# Patient Record
Sex: FEMALE | Race: WHITE
[De-identification: names, ages, dates, MRNs, and addresses within clinical notes are randomized per-mention and may not be internally consistent; named-entity substitution may affect disease eponyms.]

---

## 2019-11-24 ENCOUNTER — HOSPITAL ENCOUNTER (INPATIENT)
Dept: HOSPITAL 96 - M.ERS | Age: 54
LOS: 7 days | Discharge: ANOTHER HOSPITAL | DRG: 871 | End: 2019-12-01
Attending: INTERNAL MEDICINE | Admitting: INTERNAL MEDICINE
Payer: COMMERCIAL

## 2019-11-24 VITALS — DIASTOLIC BLOOD PRESSURE: 64 MMHG | SYSTOLIC BLOOD PRESSURE: 126 MMHG

## 2019-11-24 VITALS — DIASTOLIC BLOOD PRESSURE: 61 MMHG | SYSTOLIC BLOOD PRESSURE: 124 MMHG

## 2019-11-24 VITALS — SYSTOLIC BLOOD PRESSURE: 97 MMHG | DIASTOLIC BLOOD PRESSURE: 40 MMHG

## 2019-11-24 VITALS — DIASTOLIC BLOOD PRESSURE: 59 MMHG | SYSTOLIC BLOOD PRESSURE: 148 MMHG

## 2019-11-24 VITALS — SYSTOLIC BLOOD PRESSURE: 89 MMHG | DIASTOLIC BLOOD PRESSURE: 54 MMHG

## 2019-11-24 VITALS — DIASTOLIC BLOOD PRESSURE: 71 MMHG | SYSTOLIC BLOOD PRESSURE: 93 MMHG

## 2019-11-24 VITALS — DIASTOLIC BLOOD PRESSURE: 57 MMHG | SYSTOLIC BLOOD PRESSURE: 105 MMHG

## 2019-11-24 VITALS — WEIGHT: 293 LBS | BODY MASS INDEX: 44.41 KG/M2 | HEIGHT: 68 IN

## 2019-11-24 VITALS — DIASTOLIC BLOOD PRESSURE: 62 MMHG | SYSTOLIC BLOOD PRESSURE: 81 MMHG

## 2019-11-24 DIAGNOSIS — E78.5: ICD-10-CM

## 2019-11-24 DIAGNOSIS — Z23: ICD-10-CM

## 2019-11-24 DIAGNOSIS — E66.01: ICD-10-CM

## 2019-11-24 DIAGNOSIS — K82.9: ICD-10-CM

## 2019-11-24 DIAGNOSIS — Z96.642: ICD-10-CM

## 2019-11-24 DIAGNOSIS — G89.29: ICD-10-CM

## 2019-11-24 DIAGNOSIS — I48.21: ICD-10-CM

## 2019-11-24 DIAGNOSIS — F41.9: ICD-10-CM

## 2019-11-24 DIAGNOSIS — Z79.899: ICD-10-CM

## 2019-11-24 DIAGNOSIS — Z88.8: ICD-10-CM

## 2019-11-24 DIAGNOSIS — A41.51: Primary | ICD-10-CM

## 2019-11-24 DIAGNOSIS — F41.1: ICD-10-CM

## 2019-11-24 DIAGNOSIS — N17.0: ICD-10-CM

## 2019-11-24 DIAGNOSIS — Z87.891: ICD-10-CM

## 2019-11-24 DIAGNOSIS — I48.20: ICD-10-CM

## 2019-11-24 DIAGNOSIS — F32.9: ICD-10-CM

## 2019-11-24 DIAGNOSIS — Z82.49: ICD-10-CM

## 2019-11-24 DIAGNOSIS — M17.0: ICD-10-CM

## 2019-11-24 DIAGNOSIS — E11.9: ICD-10-CM

## 2019-11-24 DIAGNOSIS — K83.09: ICD-10-CM

## 2019-11-24 DIAGNOSIS — K80.01: ICD-10-CM

## 2019-11-24 DIAGNOSIS — K76.0: ICD-10-CM

## 2019-11-24 DIAGNOSIS — E87.6: ICD-10-CM

## 2019-11-24 LAB
ABSOLUTE MONOCYTES: 1.2 THOU/UL (ref 0–1.2)
ALBUMIN SERPL-MCNC: 3.6 G/DL (ref 3.4–5)
ALP SERPL-CCNC: 258 U/L (ref 46–116)
ALT SERPL-CCNC: 410 U/L (ref 30–65)
ANION GAP SERPL CALC-SCNC: 15 MMOL/L (ref 7–16)
APTT BLD: 27.3 SECONDS (ref 25–31.3)
AST SERPL-CCNC: 356 U/L (ref 15–37)
BILIRUB DIRECT SERPL-MCNC: 4.4 MG/DL
BILIRUB SERPL-MCNC: 5.3 MG/DL
BILIRUB SERPL-MCNC: 5.3 MG/DL
BILIRUB UR-MCNC: (no result) MG/DL
BUN SERPL-MCNC: 23 MG/DL (ref 7–18)
CALCIUM SERPL-MCNC: 9 MG/DL (ref 8.5–10.1)
CHLORIDE SERPL-SCNC: 97 MMOL/L (ref 98–107)
CO2 SERPL-SCNC: 22 MMOL/L (ref 21–32)
COLOR UR: (no result)
CREAT SERPL-MCNC: 1.2 MG/DL (ref 0.6–1.3)
GLUCOSE SERPL-MCNC: 124 MG/DL (ref 70–99)
GRANULOCYTES NFR BLD MANUAL: 87 %
HCT VFR BLD CALC: 41.1 % (ref 37–47)
HGB BLD-MCNC: 14 GM/DL (ref 12–15)
INR PPP: 1.1
KETONES UR STRIP-MCNC: NEGATIVE MG/DL
LIPASE: 43 U/L (ref 73–393)
LYMPHOCYTES # BLD: 1.9 THOU/UL (ref 0.8–5.3)
LYMPHOCYTES NFR BLD AUTO: 8 %
MCH RBC QN AUTO: 30.8 PG (ref 26–34)
MCHC RBC AUTO-ENTMCNC: 34 G/DL (ref 28–37)
MCV RBC: 90.4 FL (ref 80–100)
MONOCYTES NFR BLD: 5 %
MPV: 8.6 FL. (ref 7.2–11.1)
NEUTROPHILS # BLD: 20.3 THOU/UL (ref 1.6–8.1)
NUCLEATED RBCS: 0 /100WBC
PLATELET # BLD EST: ADEQUATE 10*3/UL
PLATELET COUNT*: 314 THOU/UL (ref 150–400)
POTASSIUM SERPL-SCNC: 3.1 MMOL/L (ref 3.5–5.1)
PROT SERPL-MCNC: 8.5 G/DL (ref 6.4–8.2)
PROT UR QL STRIP: (no result)
PROTHROMBIN TIME: 11.4 SECONDS (ref 9.2–11.5)
RBC # BLD AUTO: 4.54 MIL/UL (ref 4.2–5)
RBC # UR STRIP: NEGATIVE /UL
RBC MORPH BLD: NORMAL
RDW-CV: 13.2 % (ref 10.5–14.5)
SODIUM SERPL-SCNC: 134 MMOL/L (ref 136–145)
SP GR UR STRIP: <= 1.005 (ref 1–1.03)
URINE CLARITY: CLEAR
URINE GLUCOSE-RANDOM: NEGATIVE
URINE LEUKOCYTES-REFLEX: NEGATIVE
URINE NITRITE-REFLEX: NEGATIVE
UROBILINOGEN UR STRIP-ACNC: 2 E.U./DL (ref 0.2–1)
WBC # BLD AUTO: 23.3 THOU/UL (ref 4–11)

## 2019-11-24 NOTE — PROC
[facility] 
[address] [place]  [address]                    PROCEDURE REPORT              
_______________________________________________________________________________
 
Name:       NARINDER REHMAN           Room:           [address] [place]  
M.R.#:  [id number]      Account #:      [id number]  
Admission:  11/24/19     Attend Phys:    Amrik Kessler MD 
Discharge:  12/01/19     Date of Birth:  [birth year]  
         Report #: [id number]
                                                                                
_______________________________________________________________________________
THIS REPORT FOR:  //name//                      
 
For GI report, please see the Provation report in Perceptive 7 content.
 
 
 
 
 
 
 
 
 
 
 
 
 
 
 
 
 
 
 
 
 
 
 
 
 
 
 
 
 
 
 
 
 
 
 
 
 
 
 
 
 
                       
                                        By:                                
                 
D: 11/27/19     _______________________________________
T: 12/04/19 0642Medical Records Staff DAVIAN       /AL

## 2019-11-25 VITALS — DIASTOLIC BLOOD PRESSURE: 40 MMHG | SYSTOLIC BLOOD PRESSURE: 90 MMHG

## 2019-11-25 VITALS — SYSTOLIC BLOOD PRESSURE: 115 MMHG | DIASTOLIC BLOOD PRESSURE: 57 MMHG

## 2019-11-25 VITALS — SYSTOLIC BLOOD PRESSURE: 133 MMHG | DIASTOLIC BLOOD PRESSURE: 67 MMHG

## 2019-11-25 VITALS — DIASTOLIC BLOOD PRESSURE: 85 MMHG | SYSTOLIC BLOOD PRESSURE: 140 MMHG

## 2019-11-25 VITALS — SYSTOLIC BLOOD PRESSURE: 93 MMHG | DIASTOLIC BLOOD PRESSURE: 68 MMHG

## 2019-11-25 LAB
ABSOLUTE BASOPHILS: 0 THOU/UL (ref 0–0.2)
ABSOLUTE EOSINOPHILS: 0 THOU/UL (ref 0–0.7)
ABSOLUTE MONOCYTES: 0.5 THOU/UL (ref 0–1.2)
ALBUMIN SERPL-MCNC: 2.9 G/DL (ref 3.4–5)
ALP SERPL-CCNC: 202 U/L (ref 46–116)
ALT SERPL-CCNC: 298 U/L (ref 30–65)
ANION GAP SERPL CALC-SCNC: 14 MMOL/L (ref 7–16)
AST SERPL-CCNC: 207 U/L (ref 15–37)
BASOPHILS NFR BLD AUTO: 0.1 %
BILIRUB SERPL-MCNC: 4.8 MG/DL
BUN SERPL-MCNC: 26 MG/DL (ref 7–18)
CALCIUM SERPL-MCNC: 8.1 MG/DL (ref 8.5–10.1)
CHLORIDE SERPL-SCNC: 102 MMOL/L (ref 98–107)
CO2 SERPL-SCNC: 22 MMOL/L (ref 21–32)
CREAT SERPL-MCNC: 1.1 MG/DL (ref 0.6–1.3)
EOSINOPHIL NFR BLD: 0.1 %
GLUCOSE SERPL-MCNC: 46 MG/DL (ref 70–99)
GRANULOCYTES NFR BLD MANUAL: 93.1 %
HCT VFR BLD CALC: 36.2 % (ref 37–47)
HGB BLD-MCNC: 11.9 GM/DL (ref 12–15)
INR PPP: 1.2
LYMPHOCYTES # BLD: 0.5 THOU/UL (ref 0.8–5.3)
LYMPHOCYTES NFR BLD AUTO: 3.4 %
MAGNESIUM SERPL-MCNC: 1.7 MG/DL (ref 1.8–2.4)
MCH RBC QN AUTO: 30.8 PG (ref 26–34)
MCHC RBC AUTO-ENTMCNC: 32.9 G/DL (ref 28–37)
MCV RBC: 93.7 FL (ref 80–100)
MONOCYTES NFR BLD: 3.3 %
MPV: 8.5 FL. (ref 7.2–11.1)
NEUTROPHILS # BLD: 14.1 THOU/UL (ref 1.6–8.1)
NUCLEATED RBCS: 0 /100WBC
PLATELET COUNT*: 178 THOU/UL (ref 150–400)
POTASSIUM SERPL-SCNC: 3.7 MMOL/L (ref 3.5–5.1)
PROT SERPL-MCNC: 6.7 G/DL (ref 6.4–8.2)
PROTHROMBIN TIME: 12.7 SECONDS (ref 9.2–11.5)
RBC # BLD AUTO: 3.86 MIL/UL (ref 4.2–5)
RDW-CV: 13.7 % (ref 10.5–14.5)
SODIUM SERPL-SCNC: 138 MMOL/L (ref 136–145)
WBC # BLD AUTO: 15.1 THOU/UL (ref 4–11)

## 2019-11-25 NOTE — 2DMMODE
[place] [address]
Phone:  [phone] 2 D/M-MODE ECHOCARDIOGRAM     
_______________________________________________________________________________
 
Name:         NARINDER REHMAN          Room:          [address] [place] 
[facility]#:    [id number]     Account #:     [id number]  
Admission:    11/24/19    Attend Phys:   Amrik Kessler, 
Discharge:                Date of Birth: [birth year]  
Date of Service: 11/25/19 1607  Report #:      [id number]
        [id number]
_______________________________________________________________________________
THIS REPORT FOR:  //name//                      
 
 
--------------- ADDENDUM APPROVED REPORT --------------
 
 
Study performed:  11/25/2019 14:36:44
 
EXAM: Comprehensive 2D, Doppler, and color-flow 
Echocardiogram 
Patient Location: In-Patient   
Room #:  209     Status:  routine
 
       BSA:         2.52
HR: 101 bpm   BP:          90/40 mmHg 
Rhythm: Atrial Fibrillation    
 
Other Information 
Technically limited study due to  PATIENT COULD NOT TOLERATE APICAL 
VIEWS.
 
Indications
Atrial Fibrillation
 
2D Dimensions
IVSd:  11.54 (7-11mm) LVOT Diam:  23.68 (18-24mm) 
LVDd:  51.35 mm  
PWd:  11.48 (7-11mm) Ascending Ao:  40.63 (22-36mm)
LVDs:  44.45 (25-40mm) 
Aortic Root:  35.58 mm 
 
Pulmonary Valve
PV Peak Phil.:  1.00 m/s PV Peak Gr.:  4.02 mmHg
 
Tricuspid Valve
    RAP Estimate:  5.00 mmHg
TR Peak Gr.:  32.44 mmHg RVSP:  37.00 mmHg
    PA Pressure:  37.00 mmHg
 
Left Ventricle
The left ventricle is normal size. There is normal LV segmental wall 
motion. Mild concentric left ventricular hypertrophy. Left 
ventricular systolic function is normal. The left ventricular 
ejection fraction is within the normal range. LVEF is 60-65%. This 
study is not technically sufficient to allow evaluation of the LV 
diastolic function due to atrial fibrillation.
 
 
[place] [address]
Phone:  [phone]                     2 D/M-MODE ECHOCARDIOGRAM     
_______________________________________________________________________________
 
Name:         NARINDER REHMAN          Room:          [address]    ADM [place] 
M.R.#:    [id number]     Account #:     [id number]  
Admission:    11/24/19    Attend Phys:   Amrik Kessler, 
Discharge:                Date of Birth: [birth year]  
Date of Service: 11/25/19 1607  Report #:      [id number]
        [id number]
_______________________________________________________________________________
 
Right Ventricle
The right ventricle is normal size. The right ventricular systolic 
function is normal.
 
Atria
Left atrium is mildly dilated. The right atrium size is 
normal.
 
Aortic Valve
The aortic valve is normal in structure. No aortic regurgitation is 
present. There is no aortic valvular stenosis.
 
Mitral Valve
The mitral valve is normal in structure. Mild mitral regurgitation. 
No evidence of mitral valve stenosis.
 
Tricuspid Valve
The tricuspid valve is normal in structure. There mild tricuspid 
valve regurgitation noted. estimated pa pressure 40 mm Hg
 
Pulmonic Valve
The pulmonary valve is normal in structure. There is no pulmonic 
valvular regurgitation.
 
Great Vessels
Aortic root is mildly dilated.  IVC is normal in size and collapses 
>50% with inspiration.
 
Pericardium
There is no pericardial effusion.
 
<Conclusion>
Mild concentric left ventricular hypertrophy.
LVEF is 60-65%.
Left atrium is mildly dilated.
Mild mitral regurgitation.
There mild tricuspid valve regurgitation noted.
estimated pa pressure 40 mm Hg
 
 
 
 
 
  <ELECTRONICALLY SIGNED>
                                           By: Cb Kelly MD, FACC      
  11/25/19     1607
D: 11/25/19 1607   _____________________________________
T: 11/25/19 1607   Cb Kelly MD, FACC        /INF

## 2019-11-25 NOTE — NUR
RECEIVED REPORT AND ASSUMED CARE AT 1900. PT IN ER ROOM 17. ASSESSMENT
COMPLETED AS CHARTED. FAMILY BEDSIDE. PT HR SUSTAINING 150-170'S. PHYSICIA
NOTIFIED. ORDERS RECEIVED FOR CARDIZEM TITRATION. MEDICATION ADMIN PER EMAR.
PT REPORTED FEELING BETTER, -120'S. ON RA. NO COMPLAINTS OF PAIN.
PHYSICIAN NOTIFIED. PT ADMITTED TO TELE. REPORT CALLED. PT TRANSPORTED TO ROOM
209.

## 2019-11-25 NOTE — NUR
AM TEMP 102.0, TYLENOL WITH SIP OF H20 OTHER WISE REMAINS NPO. APPEARED TO BE
BLOOD TINGED STOOL SMEARED ON BED SHEET AND PT BUTTOCKS. UNABLE TO COLLECT A
SPECIMEN. AM HGB 11.9. GI CONSULTED FOR THIS AM.

## 2019-11-25 NOTE — CON
[address]  [address]                    CONSULTATION                  
_______________________________________________________________________________
 
Name:       NARINDER REHMAN           Room:           [address]    [place]M [place]  
M.R.#:  [id number]      Account #:      [id number]  
Admission:  19     Attend Phys:    Amrik Kessler MD 
Discharge:               Date of Birth:  [birth year]  
         Report #: [id number]
                                                                     [id number]  
_______________________________________________________________________________
THIS REPORT FOR:  //name//                      
 
CC: [place] physician/PCP
    Amrik COON DO
 
DATE OF SERVICE:  2019
 
 
HISTORY OF PRESENT ILLNESS:  The patient is a 54-year-old single white female,
who I was asked to see in the hospital today after she was noted to be in atrial
fibrillation.  The patient has an extensive past medical history.  She
previously weighed over 600 pounds and underwent gastric bypass surgery in
[place] years ago.  She is now down to 328 pounds.  She has a history of
AFib.  She actually saw Dr. Zhou in  and he performed cardioversion.  She
was placed on sotalol, but had recurrent AFib.  Dr. Zhou finally decided to
aim for rate control with sotalol rather than repeat cardioversion.  She has
never been anticoagulated.  She is not very active because of large size, and
primarily using a wheelchair.  She notes she was doing well until yesterday, she
had vomiting.  She felt chills and shortness of breath.  She came to the
Emergency Room by ambulance.  She was found to be in rapid atrial fibrillation. 
She was started on IV Cardizem.  I was asked to see her for further evaluation
and treatment.  She did note some chest discomfort that went into her back.  She
denied lightheadedness or syncope.
 
PAST MEDICAL HISTORY:  Otherwise, she has had , hernia repair.  No
history of hypertension, diabetes, hyperlipidemia.
 
MEDICATIONS:  On admission include sotalol 80 mg twice a day, which she has
taken for a couple of days.  She takes an aspirin a day, potassium pills,
trazodone, Paxil.
 
ALLERGIES:  SHE HAS A PREVIOUS INTOLERANCE TO DEMEROL AND CARDIZEM.
 
FAMILY HISTORY:  Her mother had an enlarged heart.
 
SOCIAL HISTORY:  She is  from her .  She lives in [place]
with her son.  She is not working at this time.  She quit smoking 6 months ago,
used to smoke half pack of cigarettes a day.  Rarely drinks alcohol.  She is on
disability due to arthritis.
 
REVIEW OF SYSTEMS:  No history of stroke, sleep apnea.  She has a history of
gallstones, no kidney disease.  No chronic skin condition.
 
PHYSICAL EXAMINATION:
GENERAL:  Revealed a large middle-aged female, lying in bed[place] [address]                    CONSULTATION                  
_______________________________________________________________________________
 
Name:       NARINDER REHMAN SUMAYA           Room:           [address] [place]  
[facility].#:  [id number]      Account #:      [id number]  
Admission:  19     Attend Phys:    Amrik Kessler MD 
Discharge:               Date of Birth:  [birth year]  
         Report #: [id number]
                                                                     [id number]  
_______________________________________________________________________________
distress.
VITAL SIGNS:  She has a blood pressure of 120/60, pulse is 90.  She is afebrile.
HEENT:  She is anicteric.  Conjunctivae are pink.  Mucous membranes are moist.
NECK:  Veins difficult to assess.
CHEST:  Clear to auscultation.
CARDIOVASCULAR:  Irregular rhythm.  No significant murmur.
ABDOMEN:  Obese.
EXTREMITIES:  Had no pitting edema.  Dorsalis pedis pulse 1+ bilaterally.
SKIN:  Cool and dry.
NEUROLOGIC:  Nonfocal.
 
RADIOLOGICAL DATA:  Her ECG on admission showed atrial fibrillation with rapid
ventricular response rate, nonspecific ST and T-wave change.  Her workup, she
had an echocardiogram in  that showed ejection fraction of 50%, moderate
tricuspid regurgitation.  Her x-rays, yesterday she had a chest x-ray that
showed no acute abnormality.
 
LABORATORY WORK:  Sodium 138, creatinine 1.1, SGOT 207, bilirubin 4.8, SGPT 298,
alkaline phosphatase 202.
 
Her white blood cell count was 15.1, hemoglobin 11.9.
 
IMPRESSION AND RECOMMENDATIONS:
1.  Permanent atrial fibrillation.  I would recommend switching from sotalol to
metoprolol for rate control.  Since her LIZ score is only 0, I would not
recommend anticoagulation at this time.
2.  Previous tobacco abuse.
3.  Morbid obesity.
4.  Gallstones.
5.  Elevated liver function studies.
 
 
 
 
 
 
 
 
 
 
 
 
 
 
<ELECTRONICALLY SIGNED>
                                        By:  Cb Kelly MD, FACC      
19     1404
D: 19 0924_______________________________________
T: 19 1022Davibaldev Kelly MD, FACC         /nt

## 2019-11-25 NOTE — EKG
[place] [address]
Phone:  [phone]                     ELECTROCARDIOGRAM REPORT      
_______________________________________________________________________________
 
Name:       NARINDER REHMAN           Room:           [address]    ADM [place]  
M.R.#:  [id number]      Account #:      [id number]  
Admission:  19     Attend Phys:    Amrik Kessler MD 
Discharge:               Date of Birth:  [birth year]  
         Report #: [id number]
    [id number]
_______________________________________________________________________________
THIS REPORT FOR:  //name//                      
 
                         [facility] ED
                                       
Test Date:    2019               Test Time:    15:49:21
Pat Name:     NARINDER REHMAN             Department:   
Patient ID:   [id number]            Room:         [facility]
Gender:       F                        Technician:   LATONYA
:          1965               Requested By: Stefan Mancini
Order Number: [id number] MD:   Cb Kelly
                                 Measurements
Intervals                              Axis          
Rate:         196                      P:            192
HI:           132                      QRS:          26
QRSD:         83                       T:            230
QT:           238                                    
QTc:          430                                    
                           Interpretive Statements
atrial fibrillation
Repolarization abnormality, prob rate related
Baseline wander in lead(s) V4,V5
Compared to ECG 2016 10:57:15
 
rate has increased
T-wave abnormality no longer present
 
Electronically Signed On 2019 13:09:15 CST by Cb Kelly
[email or web address]
 
 
 
 
 
 
 
 
 
 
 
 
 
 
 
  <ELECTRONICALLY SIGNED>
                                           By: Cb Kelly MD, FACC      
  19     1309
D: 19 1549   _____________________________________
T: 19 1549   Cb Kelly MD, [facility]        /EPI

## 2019-11-25 NOTE — NUR
PT OKAY FOR SIPS DIET PER SURGERY. TOLERATING SIPS. ONE LOOSE BM THIS SHIFT,
DARK RED COLORED. UP TO THE BSC, STB. A FIB ON THE MONITOR, RATE CONTROLLED,
CARDIZEM TITRATED TO 5 MG/HR. NS  MLS/HR. DILAUDID ADMINISTERED ONCE FOR
ABD AND BACK PAIN. PT GETS VERY EMOTIONAL AND TEARFUL AT TIMES, EMOTIONAL
SUPPORT PROVIDED. MAGNESIUM REPLACED. PT TO BE KEPT MID NIGHT NPO FOR IR
BILIARY CHOLANGIOGRAM TOMORROW.

## 2019-11-25 NOTE — NUR
Pt is A&O. Resides at home with her son. Independent. Pt has a walker and wc
that she can use if needed. Hx of HH. No hx of SNF. Goal is home at [place].
Following.

## 2019-11-25 NOTE — NUR
PT ADMIT  AT 2250. ALERT ORIENTED. ON CARDIZEM QTT AT 15MG/HR. HR AFIB
WITH RATE UP . VIVIAN IN PLACE. DR ZUNIGA NOTIFIED OF VIVIAN, ORDER
OBTAINED TO KEEP. NS AT 250MLS/HR. ORDER TO RUN AT 100MLS/HR AT 0600. HR NOW
AFIB . PT GIVEN AMBIEN TO SLEEP. PT MADE NPO AT [place] FOR CARDIOLOGY
CONSULT. BRODERICK.

## 2019-11-26 VITALS — DIASTOLIC BLOOD PRESSURE: 64 MMHG | SYSTOLIC BLOOD PRESSURE: 104 MMHG

## 2019-11-26 VITALS — DIASTOLIC BLOOD PRESSURE: 75 MMHG | SYSTOLIC BLOOD PRESSURE: 113 MMHG

## 2019-11-26 VITALS — SYSTOLIC BLOOD PRESSURE: 129 MMHG | DIASTOLIC BLOOD PRESSURE: 81 MMHG

## 2019-11-26 VITALS — DIASTOLIC BLOOD PRESSURE: 85 MMHG | SYSTOLIC BLOOD PRESSURE: 127 MMHG

## 2019-11-26 VITALS — DIASTOLIC BLOOD PRESSURE: 63 MMHG | SYSTOLIC BLOOD PRESSURE: 117 MMHG

## 2019-11-26 VITALS — DIASTOLIC BLOOD PRESSURE: 68 MMHG | SYSTOLIC BLOOD PRESSURE: 113 MMHG

## 2019-11-26 VITALS — DIASTOLIC BLOOD PRESSURE: 73 MMHG | SYSTOLIC BLOOD PRESSURE: 117 MMHG

## 2019-11-26 VITALS — DIASTOLIC BLOOD PRESSURE: 69 MMHG | SYSTOLIC BLOOD PRESSURE: 132 MMHG

## 2019-11-26 VITALS — SYSTOLIC BLOOD PRESSURE: 117 MMHG | DIASTOLIC BLOOD PRESSURE: 73 MMHG

## 2019-11-26 VITALS — SYSTOLIC BLOOD PRESSURE: 122 MMHG | DIASTOLIC BLOOD PRESSURE: 75 MMHG

## 2019-11-26 VITALS — SYSTOLIC BLOOD PRESSURE: 132 MMHG | DIASTOLIC BLOOD PRESSURE: 67 MMHG

## 2019-11-26 VITALS — DIASTOLIC BLOOD PRESSURE: 97 MMHG | SYSTOLIC BLOOD PRESSURE: 123 MMHG

## 2019-11-26 VITALS — SYSTOLIC BLOOD PRESSURE: 132 MMHG | DIASTOLIC BLOOD PRESSURE: 65 MMHG

## 2019-11-26 VITALS — SYSTOLIC BLOOD PRESSURE: 126 MMHG | DIASTOLIC BLOOD PRESSURE: 62 MMHG

## 2019-11-26 LAB
ABSOLUTE BASOPHILS: 0 THOU/UL (ref 0–0.2)
ABSOLUTE EOSINOPHILS: 0.1 THOU/UL (ref 0–0.7)
ABSOLUTE MONOCYTES: 0.4 THOU/UL (ref 0–1.2)
ALBUMIN SERPL-MCNC: 2.4 G/DL (ref 3.4–5)
ALP SERPL-CCNC: 160 U/L (ref 46–116)
ALT SERPL-CCNC: 173 U/L (ref 30–65)
ANION GAP SERPL CALC-SCNC: 9 MMOL/L (ref 7–16)
AST SERPL-CCNC: 103 U/L (ref 15–37)
BASOPHILS NFR BLD AUTO: 0.5 %
BILIRUB SERPL-MCNC: 3.4 MG/DL
BUN SERPL-MCNC: 16 MG/DL (ref 7–18)
CALCIUM SERPL-MCNC: 8.1 MG/DL (ref 8.5–10.1)
CHLORIDE SERPL-SCNC: 103 MMOL/L (ref 98–107)
CO2 SERPL-SCNC: 24 MMOL/L (ref 21–32)
CREAT SERPL-MCNC: 0.7 MG/DL (ref 0.6–1.3)
EOSINOPHIL NFR BLD: 1.8 %
GLUCOSE SERPL-MCNC: 81 MG/DL (ref 70–99)
GRANULOCYTES NFR BLD MANUAL: 77 %
HCT VFR BLD CALC: 31 % (ref 37–47)
HGB BLD-MCNC: 10.4 GM/DL (ref 12–15)
LYMPHOCYTES # BLD: 1 THOU/UL (ref 0.8–5.3)
LYMPHOCYTES NFR BLD AUTO: 15 %
MCH RBC QN AUTO: 30.9 PG (ref 26–34)
MCHC RBC AUTO-ENTMCNC: 33.4 G/DL (ref 28–37)
MCV RBC: 92.4 FL (ref 80–100)
MONOCYTES NFR BLD: 5.7 %
MPV: 9.3 FL. (ref 7.2–11.1)
NEUTROPHILS # BLD: 5 THOU/UL (ref 1.6–8.1)
NUCLEATED RBCS: 0 /100WBC
PLATELET COUNT*: 144 THOU/UL (ref 150–400)
POTASSIUM SERPL-SCNC: 3.2 MMOL/L (ref 3.5–5.1)
PROT SERPL-MCNC: 6.3 G/DL (ref 6.4–8.2)
RBC # BLD AUTO: 3.35 MIL/UL (ref 4.2–5)
RDW-CV: 14 % (ref 10.5–14.5)
SODIUM SERPL-SCNC: 136 MMOL/L (ref 136–145)
WBC # BLD AUTO: 6.5 THOU/UL (ref 4–11)

## 2019-11-26 NOTE — NUR
VSS, ASSUMED CARE IN THE AM, ASSESSMENT PERFORMED AND CHARTED, FALL
PRECAUTIONS IN PLACE AND CALL LIGHT IN REACH, PT IS A&O4 BUT IS HAS ANXIETY
AND
STATES PAIN IN ABD AND LEFT ARM, PT IS ON RA AND IS UP WITH ONE ASSIST, PT IS
TRACING AFIB ON THE MONITOR, PT GOAL IS TO COMPLETE CPT WITH IR, WILL FOLLOW
WITH PLAN OF CARE AND HOURLY ROUNDS.

## 2019-11-26 NOTE — NUR
Pt is aox4, reading a-fib on telemetry, respirations are even and unlabored.
Pt is not in acute distress at this time. Will continued to monitor.

## 2019-11-27 VITALS — DIASTOLIC BLOOD PRESSURE: 74 MMHG | SYSTOLIC BLOOD PRESSURE: 127 MMHG

## 2019-11-27 VITALS — DIASTOLIC BLOOD PRESSURE: 86 MMHG | SYSTOLIC BLOOD PRESSURE: 125 MMHG

## 2019-11-27 VITALS — SYSTOLIC BLOOD PRESSURE: 139 MMHG | DIASTOLIC BLOOD PRESSURE: 67 MMHG

## 2019-11-27 VITALS — SYSTOLIC BLOOD PRESSURE: 132 MMHG | DIASTOLIC BLOOD PRESSURE: 64 MMHG

## 2019-11-27 VITALS — SYSTOLIC BLOOD PRESSURE: 149 MMHG | DIASTOLIC BLOOD PRESSURE: 94 MMHG

## 2019-11-27 LAB
ABSOLUTE BASOPHILS: 0 THOU/UL (ref 0–0.2)
ABSOLUTE EOSINOPHILS: 0.1 THOU/UL (ref 0–0.7)
ABSOLUTE MONOCYTES: 0.3 THOU/UL (ref 0–1.2)
ALBUMIN SERPL-MCNC: 2.3 G/DL (ref 3.4–5)
ALP SERPL-CCNC: 160 U/L (ref 46–116)
ALT SERPL-CCNC: 138 U/L (ref 30–65)
ANION GAP SERPL CALC-SCNC: 9 MMOL/L (ref 7–16)
AST SERPL-CCNC: 73 U/L (ref 15–37)
BASOPHILS NFR BLD AUTO: 0.4 %
BILIRUB SERPL-MCNC: 2.8 MG/DL
BUN SERPL-MCNC: 9 MG/DL (ref 7–18)
CALCIUM SERPL-MCNC: 8.4 MG/DL (ref 8.5–10.1)
CANCER AG19-9 SERPL-ACNC: 107 U/ML (ref 0–35)
CHLORIDE SERPL-SCNC: 102 MMOL/L (ref 98–107)
CO2 SERPL-SCNC: 25 MMOL/L (ref 21–32)
CREAT SERPL-MCNC: 0.7 MG/DL (ref 0.6–1.3)
EOSINOPHIL NFR BLD: 2.2 %
GLUCOSE SERPL-MCNC: 79 MG/DL (ref 70–99)
GRANULOCYTES NFR BLD MANUAL: 75.1 %
HCT VFR BLD CALC: 29.1 % (ref 37–47)
HGB BLD-MCNC: 9.9 GM/DL (ref 12–15)
LYMPHOCYTES # BLD: 0.7 THOU/UL (ref 0.8–5.3)
LYMPHOCYTES NFR BLD AUTO: 15.2 %
MCH RBC QN AUTO: 31.2 PG (ref 26–34)
MCHC RBC AUTO-ENTMCNC: 33.9 G/DL (ref 28–37)
MCV RBC: 92 FL (ref 80–100)
MONOCYTES NFR BLD: 7.1 %
MPV: 8.7 FL. (ref 7.2–11.1)
NEUTROPHILS # BLD: 3.4 THOU/UL (ref 1.6–8.1)
NUCLEATED RBCS: 0 /100WBC
PLATELET COUNT*: 153 THOU/UL (ref 150–400)
POTASSIUM SERPL-SCNC: 3.9 MMOL/L (ref 3.5–5.1)
PROT SERPL-MCNC: 6.5 G/DL (ref 6.4–8.2)
RBC # BLD AUTO: 3.16 MIL/UL (ref 4.2–5)
RDW-CV: 14 % (ref 10.5–14.5)
SODIUM SERPL-SCNC: 136 MMOL/L (ref 136–145)
WBC # BLD AUTO: 4.6 THOU/UL (ref 4–11)

## 2019-11-27 PROCEDURE — 0F9930Z DRAINAGE OF COMMON BILE DUCT WITH DRAINAGE DEVICE, PERCUTANEOUS APPROACH: ICD-10-PCS

## 2019-11-27 PROCEDURE — BF101ZZ FLUOROSCOPY OF BILE DUCTS USING LOW OSMOLAR CONTRAST: ICD-10-PCS | Performed by: RADIOLOGY

## 2019-11-27 PROCEDURE — 0DJ08ZZ INSPECTION OF UPPER INTESTINAL TRACT, VIA NATURAL OR ARTIFICIAL OPENING ENDOSCOPIC: ICD-10-PCS | Performed by: INTERNAL MEDICINE

## 2019-11-27 NOTE — NUR
Nutrition: Pt admitted with epigastric pain. Seen for high BMI. Pt not in room
at time of visit. Admit wt: 328#, possibly trending up, currently 356#. Unable
to reweigh pt at this time - please reweigh for accuracy. +BM. NPO. Biliary
obstruction, alfredo drain. Per progress notes, pt has surgical changes from
gastric bypass. Alb 2.3, prealb 11. On MVI.
Severely depleted protein stores. Recommend protein supplement once diet
advances. Mild to moderate risk. Will follow up per protocol, 12/2/19.

## 2019-11-27 NOTE — CON
[address]  [address]                    CONSULTATION                  
_______________________________________________________________________________
 
Name:       NARINDER REHMAN           Room:           [address] [place]  
M.R.#:  [id number]      Account #:      [id number]  
Admission:  11/24/19     Attend Phys:    Amrik Kessler MD 
Discharge:               Date of Birth:  [birth year]  
         Report #: [id number]
                                                                     [id number]  
_______________________________________________________________________________
THIS REPORT FOR:  //name//                      
 
CC: Amrik Wilkes
 
DATE OF SERVICE:  11/26/2019
 
 
INFECTIOUS DISEASE CONSULTATION
 
ATTENDING PHYSICIAN:  Amrik Kessler M.D.
 
REASON FOR EVALUATION:  Gram-negative septicemia, biliary tract source.
 
Chart reviewed.  Patient examined.
 
HISTORY OF PRESENT ILLNESS:  This is a 54-year-old woman with history of morbid
obesity who underwent a gastric bypass procedure, yet undefined at this point,
presented with epigastric type pain and did experience some nausea with dry
heaves and subsequently developed temperature elevations, recorded temperature
up to 102.  On further evaluation, she underwent imaging, which showed
obstructive gallstone with evidence of cholecystitis and cholangitis with
hyperbilirubinemia.  As part of the admission, blood cultures were collected,
now 2/2 positive with gram-negative rods, empirically started on piperacillin
and tazobactam.  She notes that she has had significant weight loss as a result
of her surgery.  She does take precautions while eating.  Denies any significant
pulmonary-related complaints at this point.  She is not toxic.
 
ALLERGIES:  Include MEPERIDINE, FENTANYL, AND TRAMADOL.
 
CURRENT MEDICATIONS:  Include metoprolol, multivitamin, Zosyn, diltiazem,
hydromorphone, ondansetron, p.r.n. analgesics, and antiemetics.
 
PAST MEDICAL HISTORY:  As described above, morbid obesity.  At one point,
weighed greater than 600 pounds.  She has gastric bypass surgery, previous
C-sections, umbilical hernia repair, degenerative joint disease of bilateral
knees, anxiety, and depression.
 
SOCIAL HISTORY:  Former smoker, occasional ethanol, no illicit drug use.
 
FAMILY HISTORY:  Noncontributory.
 
REVIEW OF SYSTEMS:  Otherwise, unremarkable 10-point review of systems except
noted the above in the history of present illness.
 
PHYSICAL EXAMINATION:
 
 
 
[place] [address]                    CONSULTATION                  
_______________________________________________________________________________
 
Name:       NARINDER REHMAN SUMAYA           Room:           [address] [place]  
[facility]#:  [id number]      Account #:      [id number]  
Admission:  11/24/19     Attend Phys:    Amrik Kessler MD 
Discharge:               Date of Birth:  [birth year]  
         Report #: [id number]
                                                                     [id number]  
_______________________________________________________________________________
GENERAL:  She is sitting up in chair.  She is alert, cooperative.  She is
pleasant, some in mild-to-moderate distress.  She appears somewhat chronically
ill, perhaps mildly undernourished.
VITAL SIGNS:  Temperature 98.3, pulse 106, respirations 14, blood pressure
104/64.
SKIN:  Warm, dry.
HEENT:  Normocephalic.  Extraocular muscles are intact.
NECK:  Supple.
LUNGS:  Diminished breath sounds overall.  I do not appreciate any evidence of
consolidation.
HEART:  Borderline tachycardic, regular.  I do not appreciate any murmur.
ABDOMEN:  Obese, distended.  There is some tenderness in the right epigastric
region, not so much in the right upper quadrant.
EXTREMITIES:  Lower extremities have a degree of edema.
GENITOURINARY AND RECTAL:  Deferred.
 
LABORATORY DATA:  Blood cultures as described above from the 24th revealed
gram-negative rods.  TSH was 1.485.  Electrolytes most recently, sodium 136,
potassium 3.2, chloride 103, bicarbonate 24, anion gap of 9, BUN and creatinine
16 and 0.7, glucose of 81.  AST of 103 that is down initially from 356, ALT of
298 that is down from 410.  Most recent direct bilirubin of 3.6, actually, total
bilirubin was 5.3 on admission and direct was 4.4.  Initial lactic acid was 3.7,
repeat was 2.7.  Troponin is less than 0.06.  Abdominal ultrasound showed
cholelithiasis, consistent with very large calculus, marked diffuse gallbladder
mural thickening suggesting cholecystitis, hepatomegaly.  Echo showed EF of
60-65%, mild mitral regurgitation.  MRI and MRCP confirmed the cholelithiasis,
acute cholecystitis, mild intraductal dilatation.
 
ASSESSMENT AND PLAN:
1.  Obstructing gallstone complicated by cholangitis, acute cholecystitis. 
Agree with empiric therapy for broad-spectrum gram-negative coverage.
2.  Gram-negative rods isolated on blood culture.  Again, I think this most
likely source.  Seemingly, she is not overtly toxic, needs a resolution of her
obstruction primarily to correct.  Certainly at risk for nosocomial related
infectious complications.  There is a spirometer made available.  She was
encouraged to use that.  We will continue to monitor expectantly.  Discussed
with she and her son.
 
 
 
 
 
 
 
<ELECTRONICALLY SIGNED>
                                        By:  Cristiano Chin MD           
11/27/19     1124
D: 11/26/19 1120_______________________________________
T: 11/26/19 1203Jokyler Chin MD              /nt

## 2019-11-27 NOTE — NUR
PT SPILLED BILARY DRAIN DRAINAGE ONTO THE FLOOR. SOMEHOW PT WAS ABLE TO OPEN
THE DRAIN AND SPILLED LL THE CONTENTS OUT ONTO THE FLOOR. I AM UNABLE TO EVEN
ESTIMATE THE OUTPUT.

## 2019-11-27 NOTE — NUR
Pt is aox4, running a-fib on telemetry, respirations are even and unlabored.
Pt is in no acute distress at this time. Will continue to monitor.

## 2019-11-28 VITALS — SYSTOLIC BLOOD PRESSURE: 142 MMHG | DIASTOLIC BLOOD PRESSURE: 88 MMHG

## 2019-11-28 VITALS — SYSTOLIC BLOOD PRESSURE: 128 MMHG | DIASTOLIC BLOOD PRESSURE: 71 MMHG

## 2019-11-28 VITALS — SYSTOLIC BLOOD PRESSURE: 124 MMHG | DIASTOLIC BLOOD PRESSURE: 76 MMHG

## 2019-11-28 VITALS — DIASTOLIC BLOOD PRESSURE: 64 MMHG | SYSTOLIC BLOOD PRESSURE: 91 MMHG

## 2019-11-28 VITALS — DIASTOLIC BLOOD PRESSURE: 76 MMHG | SYSTOLIC BLOOD PRESSURE: 129 MMHG

## 2019-11-28 VITALS — SYSTOLIC BLOOD PRESSURE: 129 MMHG | DIASTOLIC BLOOD PRESSURE: 66 MMHG

## 2019-11-28 LAB
ALBUMIN SERPL-MCNC: 2.4 G/DL (ref 3.4–5)
ALP SERPL-CCNC: 145 U/L (ref 46–116)
ALT SERPL-CCNC: 104 U/L (ref 30–65)
ANION GAP SERPL CALC-SCNC: 10 MMOL/L (ref 7–16)
AST SERPL-CCNC: 44 U/L (ref 15–37)
BILIRUB SERPL-MCNC: 1.8 MG/DL
BUN SERPL-MCNC: 6 MG/DL (ref 7–18)
CALCIUM SERPL-MCNC: 8.7 MG/DL (ref 8.5–10.1)
CHLORIDE SERPL-SCNC: 102 MMOL/L (ref 98–107)
CO2 SERPL-SCNC: 27 MMOL/L (ref 21–32)
CREAT SERPL-MCNC: 0.7 MG/DL (ref 0.6–1.3)
GLUCOSE SERPL-MCNC: 122 MG/DL (ref 70–99)
HCT VFR BLD CALC: 28.5 % (ref 37–47)
HGB BLD-MCNC: 9.7 GM/DL (ref 12–15)
MCH RBC QN AUTO: 31.3 PG (ref 26–34)
MCHC RBC AUTO-ENTMCNC: 33.9 G/DL (ref 28–37)
MCV RBC: 92.2 FL (ref 80–100)
MPV: 8.7 FL. (ref 7.2–11.1)
PLATELET COUNT*: 169 THOU/UL (ref 150–400)
POTASSIUM SERPL-SCNC: 3.3 MMOL/L (ref 3.5–5.1)
PROT SERPL-MCNC: 6.8 G/DL (ref 6.4–8.2)
RBC # BLD AUTO: 3.1 MIL/UL (ref 4.2–5)
RDW-CV: 14 % (ref 10.5–14.5)
SODIUM SERPL-SCNC: 139 MMOL/L (ref 136–145)
WBC # BLD AUTO: 4.1 THOU/UL (ref 4–11)

## 2019-11-28 NOTE — NUR
ASSUMED CARE OF PATIENT THIS AM AT 0730.  PATIENT IS ALERT AND ORIENTED X 4.
SHE C/O ADB PAIN THIS AM AND REPORTED IV MORPHINE WAS NO RELIEVING HER PAIN.
PATIENT MEDICATED WITH DILAUDID PER ORDER.  SHE STATED PAIN WAS RELIEVED.  SHE
IS BELCHING ALOT AND C/O INDIGESTION.  DR PATEL NOTIFIED.  TELE SHOWS
AFIB.  BILARY DRAIN DRAINING BROWN DRAINAGE.  WILL CONTINUE TO MONITOR COMFORT
AND SAFETY.

## 2019-11-29 VITALS — DIASTOLIC BLOOD PRESSURE: 60 MMHG | SYSTOLIC BLOOD PRESSURE: 106 MMHG

## 2019-11-29 VITALS — DIASTOLIC BLOOD PRESSURE: 91 MMHG | SYSTOLIC BLOOD PRESSURE: 141 MMHG

## 2019-11-29 VITALS — SYSTOLIC BLOOD PRESSURE: 123 MMHG | DIASTOLIC BLOOD PRESSURE: 83 MMHG

## 2019-11-29 VITALS — SYSTOLIC BLOOD PRESSURE: 133 MMHG | DIASTOLIC BLOOD PRESSURE: 67 MMHG

## 2019-11-29 VITALS — DIASTOLIC BLOOD PRESSURE: 88 MMHG | SYSTOLIC BLOOD PRESSURE: 149 MMHG

## 2019-11-29 VITALS — DIASTOLIC BLOOD PRESSURE: 52 MMHG | SYSTOLIC BLOOD PRESSURE: 80 MMHG

## 2019-11-29 VITALS — DIASTOLIC BLOOD PRESSURE: 68 MMHG | SYSTOLIC BLOOD PRESSURE: 119 MMHG

## 2019-11-29 VITALS — DIASTOLIC BLOOD PRESSURE: 70 MMHG | SYSTOLIC BLOOD PRESSURE: 97 MMHG

## 2019-11-29 LAB
ALBUMIN SERPL-MCNC: 2.7 G/DL (ref 3.4–5)
ALP SERPL-CCNC: 138 U/L (ref 46–116)
ALT SERPL-CCNC: 85 U/L (ref 30–65)
ANION GAP SERPL CALC-SCNC: 11 MMOL/L (ref 7–16)
AST SERPL-CCNC: 29 U/L (ref 15–37)
BILIRUB SERPL-MCNC: 1.5 MG/DL
BUN SERPL-MCNC: 12 MG/DL (ref 7–18)
CALCIUM SERPL-MCNC: 8.9 MG/DL (ref 8.5–10.1)
CHLORIDE SERPL-SCNC: 101 MMOL/L (ref 98–107)
CO2 SERPL-SCNC: 26 MMOL/L (ref 21–32)
CREAT SERPL-MCNC: 0.7 MG/DL (ref 0.6–1.3)
GLUCOSE SERPL-MCNC: 105 MG/DL (ref 70–99)
HCT VFR BLD CALC: 30.9 % (ref 37–47)
HGB BLD-MCNC: 10.4 GM/DL (ref 12–15)
MCH RBC QN AUTO: 30.9 PG (ref 26–34)
MCHC RBC AUTO-ENTMCNC: 33.7 G/DL (ref 28–37)
MCV RBC: 91.7 FL (ref 80–100)
MPV: 8.5 FL. (ref 7.2–11.1)
PLATELET COUNT*: 249 THOU/UL (ref 150–400)
POTASSIUM SERPL-SCNC: 3.9 MMOL/L (ref 3.5–5.1)
PROT SERPL-MCNC: 7.4 G/DL (ref 6.4–8.2)
RBC # BLD AUTO: 3.37 MIL/UL (ref 4.2–5)
RDW-CV: 13.7 % (ref 10.5–14.5)
SODIUM SERPL-SCNC: 138 MMOL/L (ref 136–145)
WBC # BLD AUTO: 5.7 THOU/UL (ref 4–11)

## 2019-11-29 NOTE — NUR
ASSUMED CARE OF PT AFTER REPORT AT 1930. PT A&OX4. VSS. PHYSICAL ASSESSMENT
COMLPETED AND CHARTED. PT ON RA. PT TRACING AFIB ON TELE. -170 WHEN
GETTING UP. PT WITH PARK TO DEPENDENT DRAIN. PT WITH BILI DRAIN PATENT. PT
COMPLAINED OF ONE EPISODE OF NAUSEA & RIGHT UPPER ABDOMINAL PAIN-MEDS GIVEN
PER MAR. CALL LIGHT WITHIN REACH.

## 2019-11-29 NOTE — NUR
PATIENT DECISION TO LOOK AT [facility] FOR HOME CARE POST
DISCHARGE.  I CALLED AND SPOKE WITH DOMONIQUE, TRANSITION NAVIGATOR FOR [employer]ENC[employer].
PHONE: [phone].
SHE WILL ATTEMPT TO MEET WITH PT BEFORE DISCHARGE TO HOME IF POSSIBLE.
PATIENT REPORTS SON & DAUGHTER ARE BOTH OFF ON SUNDAYS; SON IS OFF EVERY
WEEKEND AND DAUGHTER'S SCHEDULE VARIABLE DURING THE WEEK.

## 2019-11-29 NOTE — NUR
PT RELUCTANT TO MAKE DECISION ON HOME CARE PROVIDER COMING INTO HER HOME. PT
UNABLE TO GET TO DOOR OF HOME TO LET THEM IN AND HER SON/DTR WORK LONG HOURS
SO ARE OFTEN NOT THERE TO LET SOMEONE IN. PATIENT CONCERNED ABOUT GIVING
SOMEONE A KEY WITHOUT HAVING MET THE  SERVICE PROVIDER.

## 2019-11-30 VITALS — DIASTOLIC BLOOD PRESSURE: 57 MMHG | SYSTOLIC BLOOD PRESSURE: 126 MMHG

## 2019-11-30 VITALS — SYSTOLIC BLOOD PRESSURE: 126 MMHG | DIASTOLIC BLOOD PRESSURE: 57 MMHG

## 2019-11-30 VITALS — DIASTOLIC BLOOD PRESSURE: 91 MMHG | SYSTOLIC BLOOD PRESSURE: 143 MMHG

## 2019-11-30 VITALS — DIASTOLIC BLOOD PRESSURE: 69 MMHG | SYSTOLIC BLOOD PRESSURE: 105 MMHG

## 2019-11-30 VITALS — DIASTOLIC BLOOD PRESSURE: 57 MMHG | SYSTOLIC BLOOD PRESSURE: 104 MMHG

## 2019-11-30 LAB
ANION GAP SERPL CALC-SCNC: 9 MMOL/L (ref 7–16)
BUN SERPL-MCNC: 14 MG/DL (ref 7–18)
CALCIUM SERPL-MCNC: 8.6 MG/DL (ref 8.5–10.1)
CHLORIDE SERPL-SCNC: 100 MMOL/L (ref 98–107)
CO2 SERPL-SCNC: 27 MMOL/L (ref 21–32)
CREAT SERPL-MCNC: 0.7 MG/DL (ref 0.6–1.3)
GLUCOSE SERPL-MCNC: 83 MG/DL (ref 70–99)
HCT VFR BLD CALC: 29.9 % (ref 37–47)
HGB BLD-MCNC: 10 GM/DL (ref 12–15)
MCH RBC QN AUTO: 31 PG (ref 26–34)
MCHC RBC AUTO-ENTMCNC: 33.4 G/DL (ref 28–37)
MCV RBC: 93 FL (ref 80–100)
MPV: 8.1 FL. (ref 7.2–11.1)
PLATELET COUNT*: 289 THOU/UL (ref 150–400)
POTASSIUM SERPL-SCNC: 3.7 MMOL/L (ref 3.5–5.1)
RBC # BLD AUTO: 3.22 MIL/UL (ref 4.2–5)
RDW-CV: 14.1 % (ref 10.5–14.5)
SODIUM SERPL-SCNC: 136 MMOL/L (ref 136–145)
WBC # BLD AUTO: 8.2 THOU/UL (ref 4–11)

## 2019-11-30 NOTE — NUR
ASSUMED PT CARE REPORT RECEIVED FROM NURSE. PT IS AOX3 COMPLAINS OF PAIN IN
ABD AREA. PAIN SHOT GIVEN. PT ON RA. T TUBE AND PARK IN PLACE. TRANSFER TO
[facility] INITIATED. AWAITING FOR CONFIRMATION FROM [facility]. T TUBE
DRESSING REDONE BY NURSE. NO COMPLAINT. CALL LIGHT AT REACH. WILL CONTINUE TO
MONITOR

## 2019-11-30 NOTE — NUR
P T ALERT AND ORIENTED. EASILY TEARFUL. VSS ON RA. MEDS GIVEN PER EMAR. PT
RECEIVED PAIN MEDS MULTIPLE TIMES THIS SHIFT. PT SLEPT OFF AND ON THIS SHIFT.
LUI IV INFILTRATED AND DC'D. NEW IV TO LH 20G. NS @ 175. PT TRANSFERS VIA
WHEELCHAIR. BILI DRAIN EMPTIED THIS SHIFT. VIVIAN PATENT AND EMPTIED THIS
SHIFT. POSSIBLE TRANSFER TO [facility] PER DR GROSS. FALL PRECAUTIONS
IN PLACE. CALL LIGHT WITHIN REACH. HOURLY ROUNDINGS MADE. WILL CONTINUE TO
MONITOR.

## 2019-12-01 VITALS — DIASTOLIC BLOOD PRESSURE: 51 MMHG | SYSTOLIC BLOOD PRESSURE: 120 MMHG

## 2019-12-01 VITALS — SYSTOLIC BLOOD PRESSURE: 163 MMHG | DIASTOLIC BLOOD PRESSURE: 55 MMHG

## 2019-12-01 VITALS — SYSTOLIC BLOOD PRESSURE: 143 MMHG | DIASTOLIC BLOOD PRESSURE: 72 MMHG

## 2019-12-01 VITALS — SYSTOLIC BLOOD PRESSURE: 139 MMHG | DIASTOLIC BLOOD PRESSURE: 64 MMHG

## 2019-12-01 NOTE — NUR
[facility] ACCEPTED PATIENT. TRANSPORTATION SET UP. THIS NURSE GIVES REPORT TO
NURSE MEDINA FROM [facility]. PT TO GO TO ROOM 319. PARK OUT. BELONGINGS
PACKED. WILL AWAIT FOR .

## 2019-12-01 NOTE — NUR
ASSUMED CARE OF PT AFTER REPORT AT 1930. PT A&OX4. VSS. PHYSICAL ASSESSMENT
COMPLETED AND CHARTED. PT ON RA. PT TRACING AFIB ON TELE. PT UPSTANDBY TO
RESTROOM. PT WITH BILI DRAIN PATENT AND DRAINING WELL. PT WITH PARK TO
DEPENDENT DRAIN. PT COMPLAINED ABDOMINAL PAIN- MEDS GIVEN PER MAR. STILL
AWAITING INSURANCE APPROVAL. PT ABLE TO SLEEP WELL ON BED. CALL LIGHT WITHIN
REACH.

## 2019-12-01 NOTE — NUR
ASSUMED PT CARE REPORT RECEIVED FROM NURSE. PT IS AOX4 , TRACING AFIB ON
CARDIAC MONITOR.HEART RATE CONTROLLED IN THE 80S 90S. ON RA. O2 SAT 95%. VSS.
PAIN MEDICINE GIVEN. PT HAD A LARGE BOWEL MOVEMENT DARK BROWN COLOR. IV FLUID
INFUSING  PER HOUR. BILIDRAIN EMPTIED. CALL LIGHT AT [facility]. [facility] CONTACTED REGARDING PT TRANSFER . WILL AWAIT FOR RESPONSE. PT AWARE
OF NEW PLAN. WILL CONITNUE TO MONITOR

## 2019-12-06 NOTE — CON
[address]  [address]                    CONSULTATION                  
_______________________________________________________________________________
 
Name:       NARINDER REHMAN           Room:           [address] [place]  
M.R.#:  [id number]      Account #:      [id number]  
Admission:  19     Attend Phys:    Amrik Kessler MD 
Discharge:  19     Date of Birth:  [birth year]  
         Report #: [id number]
                                                                     [id number]  
_______________________________________________________________________________
THIS REPORT FOR:  //name//                      
 
CC: Amrik Wilkes DO
 
DATE OF SERVICE:  2019
 
 
REFERRING PHYSICIAN:  Amrik Kessler MD
 
REASON FOR CONSULTATION:  Abdominal pain and jaundice.
 
IMPRESSION:
1.  Obstructive jaundice, likely due to common bile duct stones versus Mirizzi's
syndrome and appears to be extrinsic compression of the common bile duct due to
acute cholecystitis (versus less likely malignancy or any other issues).  CT,
MRCP, and abdominal ultrasound reviewed with radiologist.
2.  Status post gastric bypass  for morbid obesity at [facility] - type unknown.
3.  Chronic atrial fibrillation, on sotalol.
 
RECOMMENDATIONS:
1.  Given the fact the patient has altered anatomy and the fact that it would be
very difficult for us to do an ERCP, we recommended that she undergo a PTC with
possible internal placement of biliary stent by Radiology tomorrow.  I have
discussed the patient's case with Dr. Amrik Black as well and he is agreeable to
the same.
2.  Depending on what type of gastric bypass she has had, we will determine
whether or not we will be able to proceed with an ERCP.  She has had a Elise-en-Y
bypass, it may not be even feasible to reach the bypassed afferent limb.  If she
has had a Billroth II surgery, which is something that they did in the remote
past, it would be feasible for us to proceed with an ERCP.  The patient is going
to find out which surgery she had and if we do not find out what she surgery she
has had, we will proceed with upper endoscopy to determine the same.
3.  We will continue with the patient on IV antibiotics.
4.  Continue with clear liquid diet for now.
5.  Further recommendation will be made after the PTC has been completed.  I
have discussed the plans with the patient as well.  She is agreeable to the
same.
 
HISTORY OF PRESENT ILLNESS:  The patient is a pleasant 54-year-old white female
who was admitted to hospital on the  with complaints of rather severe upper
abdominal pain radiating into her back and between her shoulder blades,
associated with nausea, vomiting as well as chills.  She has not had any fevers.
 She underwent a gastric bypass in  at [facility] as part of the study, at which
 
 
 
[place] [address]                    CONSULTATION                  
_______________________________________________________________________________
 
Name:       NARINDER REHMAN           Room:           [address] [place]  
M.R.#:  [id number]      Account #:      [id number]  
Admission:  19     Attend Phys:    Amrik Kessler MD 
Discharge:  19     Date of Birth:  [birth year]  
         Report #: [id number]
                                                                     [id number]  
_______________________________________________________________________________
time she weighed over 600 pounds.  She is currently over 300 pounds.  She denies
any complaints referable to her upper or lower GI tract, otherwise.  She has a
significant history of atrial fibrillation, is on sotalol.  She was seen through
the Emergency Room and found to have obstructive jaundice with dilated biliary
tree.  She had been in the hospital for further evaluation and treatment.
 
ALLERGIES:  TRAMADOL, MEPERIDINE AND FENTANYL.
 
MEDICATIONS AT HOME:  Include aspirin, potassium, iron, Paxil, ibuprofen,
multivitamin, and sotalol.
 
PAST MEDICAL HISTORY:  Remarkable for previous gastric bypass with chronic
anemia secondary to the same, has had anxiety, depression, problems with chronic
arthritis.  She has chronic atrial fibrillation as well.  She had previous
umbilical hernia repair, , generalized anxiety disorder, history of
known gallstones.  She had previous sciatic problems, pain with DJD involving
both knees.  She has a left hip replacement.  She has also had a history of
fatty liver as well.
 
SOCIAL HISTORY:  She was a former smoker, quit less than a year ago, drinks
occasional alcohol.
 
FAMILY HISTORY:  Negative.
 
PHYSICAL EXAMINATION:
GENERAL:  Revealed pleasant 54-year-old white female who is awake and alert.
CARDIOPULMONARY:  Revealed a regular rate and rhythm.
LUNGS:  Clear.
ABDOMEN:  Soft and minimally tender in the right upper quadrant.  No rebound or
guarding noted.
 
LABORATORY DATA:  Revealed a white count of 15.1, hemoglobin 11.9, platelet
count 178,000, MCV is 93.7, RDW 13.7.  Her differential is normal.  Her sodium
is 138, potassium 3.7, chloride 102, bicarbonate 22, BUN 26, creatinine 1.14,
GFR of 52.  Total bilirubin 4.8, alkaline phosphatase 202, AST is 207, . 
Albumin is 2.9.  By comparison in 2016, her liver function tests were all
completely normal.
 
CT scan of the abdomen and pelvis performed on  revealed possibly a large
mass noted within the gallbladder, which may be close to the adjacent colon. 
There is some wall thickening and some inflammatory change.  She also has ductal
dilation with intrahepatic ductal dilation, extrahepatic duct is not as
distended.  Spleen is normal.  Pancreas is normal.  Kidneys are normal.  She has
had postsurgical changes.
 
MRCP was performed on  was reviewed with radiologist.  There appeared to be
 
 
 
[address] [place]  [address]                    CONSULTATION                  
_______________________________________________________________________________
 
Name:       NARINDER REHMAN           Room:           [address] [place]  
M.R.#:  [id number]      Account #:      [id number]  
Admission:  19     Attend Phys:    Amrik Kessler MD 
Discharge:  19     Date of Birth:  [birth year]  
         Report #: [id number]
                                                                     [id number]  
_______________________________________________________________________________
a large gallbladder calculus evident that was previously noted on CT scan. 
There was also mural thickening and pericholecystic fluid in the right upper
quadrant.  The liver is enlarged.  There is nothing to suggest.  There is some
mild intrahepatic ductal dilation, possibly related to mass effect from the
enlarged gallbladder on the common hepatic duct.  MRCP was reviewed with Dr. Norton and appears to be incomplete filling of the distal common bile duct and
there appeared to be possibly some debris within the common bile duct.  It was
felt that there might be multiple small stones and debris within the distal
duct.
 
DISCUSSION:  At the present time, the patient had obstructive jaundice and
needed to have a PTC with biliary decompression which we were not able to do an
emergent ERCP with her previous gastric bypass surgery.  We will figure out what
she had for surgery.  With regards to her gastric bypass and to determine
whether or not it is feasible for us to do an ERCP while she is in the hospital.
 I have discussed the plans with the patient as well and is agreeable to the
same.
 
 
 
 
 
 
 
 
 
 
 
 
 
 
 
 
 
 
 
 
 
 
 
 
 
 
 
<ELECTRONICALLY SIGNED>
                                        By:  Gabino Pinto DO          
19     1635
D: 19 0320_______________________________________
T: 19 0615DO storm Allen

## 2019-12-18 ENCOUNTER — HOSPITAL ENCOUNTER (EMERGENCY)
Dept: HOSPITAL 96 - M.ERS | Age: 54
Discharge: ANOTHER HOSPITAL | End: 2019-12-18
Payer: COMMERCIAL

## 2019-12-18 VITALS — BODY MASS INDEX: 44.41 KG/M2 | HEIGHT: 68 IN | WEIGHT: 293 LBS

## 2019-12-18 VITALS — DIASTOLIC BLOOD PRESSURE: 74 MMHG | SYSTOLIC BLOOD PRESSURE: 104 MMHG

## 2019-12-18 DIAGNOSIS — N17.9: Primary | ICD-10-CM

## 2019-12-18 DIAGNOSIS — Z88.8: ICD-10-CM

## 2019-12-18 DIAGNOSIS — E87.1: ICD-10-CM

## 2019-12-18 DIAGNOSIS — F17.210: ICD-10-CM

## 2019-12-18 DIAGNOSIS — E66.01: ICD-10-CM

## 2019-12-18 DIAGNOSIS — Z88.6: ICD-10-CM

## 2019-12-18 DIAGNOSIS — Z98.890: ICD-10-CM

## 2019-12-18 DIAGNOSIS — M17.0: ICD-10-CM

## 2019-12-18 DIAGNOSIS — K94.23: ICD-10-CM

## 2019-12-18 DIAGNOSIS — I48.20: ICD-10-CM

## 2019-12-18 LAB
ABSOLUTE BASOPHILS: 0 THOU/UL (ref 0–0.2)
ABSOLUTE EOSINOPHILS: 0.1 THOU/UL (ref 0–0.7)
ABSOLUTE MONOCYTES: 0.7 THOU/UL (ref 0–1.2)
ALBUMIN SERPL-MCNC: 3.6 G/DL (ref 3.4–5)
ALP SERPL-CCNC: 172 U/L (ref 46–116)
ALT SERPL-CCNC: 57 U/L (ref 30–65)
ANION GAP SERPL CALC-SCNC: 13 MMOL/L (ref 7–16)
AST SERPL-CCNC: 31 U/L (ref 15–37)
BASOPHILS NFR BLD AUTO: 0.4 %
BILIRUB SERPL-MCNC: 0.7 MG/DL
BUN SERPL-MCNC: 81 MG/DL (ref 7–18)
CALCIUM SERPL-MCNC: 9.5 MG/DL (ref 8.5–10.1)
CHLORIDE SERPL-SCNC: 91 MMOL/L (ref 98–107)
CO2 SERPL-SCNC: 15 MMOL/L (ref 21–32)
CREAT SERPL-MCNC: 2.2 MG/DL (ref 0.6–1.3)
EOSINOPHIL NFR BLD: 1.1 %
GLUCOSE SERPL-MCNC: 124 MG/DL (ref 70–99)
GRANULOCYTES NFR BLD MANUAL: 83.7 %
HCT VFR BLD CALC: 44.8 % (ref 37–47)
HGB BLD-MCNC: 15.3 GM/DL (ref 12–15)
LIPASE: 542 U/L (ref 73–393)
LYMPHOCYTES # BLD: 0.9 THOU/UL (ref 0.8–5.3)
LYMPHOCYTES NFR BLD AUTO: 8.1 %
MCH RBC QN AUTO: 30.9 PG (ref 26–34)
MCHC RBC AUTO-ENTMCNC: 34 G/DL (ref 28–37)
MCV RBC: 90.9 FL (ref 80–100)
MONOCYTES NFR BLD: 6.7 %
MPV: 9.9 FL. (ref 7.2–11.1)
NEUTROPHILS # BLD: 9.2 THOU/UL (ref 1.6–8.1)
NUCLEATED RBCS: 0 /100WBC
PLATELET COUNT*: 318 THOU/UL (ref 150–400)
POTASSIUM SERPL-SCNC: 5.6 MMOL/L (ref 3.5–5.1)
PROT SERPL-MCNC: 9.5 G/DL (ref 6.4–8.2)
RBC # BLD AUTO: 4.93 MIL/UL (ref 4.2–5)
RDW-CV: 13.8 % (ref 10.5–14.5)
SODIUM SERPL-SCNC: 119 MMOL/L (ref 136–145)
WBC # BLD AUTO: 11 THOU/UL (ref 4–11)

## 2019-12-18 NOTE — EKG
[place] [address]
Phone:  [phone]                     ELECTROCARDIOGRAM REPORT      
_______________________________________________________________________________
 
Name:       NARINDER REHMAN SUMAYA           Room:                      [facility] ER  
M.RAna Paula#:  [id number]      Account #:      [id number]  
Admission:  19     Attend Phys:                         
Discharge:               Date of Birth:  [birth year]  
         Report #: [id number]
    [id number]
_______________________________________________________________________________
THIS REPORT FOR:  //name//                      
 
                         [facility] ED
                                       
Test Date:    2019               Test Time:    12:04:38
Pat Name:     NARINDER REHMAN             Department:   
Patient ID:   [id number]            Room:          
Gender:       F                        Technician:   
:          1965               Requested By: Greg Fernandes
Order Number: [id number] MD:   Reese Sales
                                 Measurements
Intervals                              Axis          
Rate:         135                      P:            
WI:                                    QRS:          30
QRSD:         97                       T:            6
QT:           285                                    
QTc:          428                                    
                           Interpretive Statements
Atrial fibrillation
Borderline repolarization abnormality
Compared to ECG 2019 15:49:21
No significant changes
 
Electronically Signed On 2019 14:16:51 CST by Reese Sales
[email or web address]
 
 
 
 
 
 
 
 
 
 
 
 
 
 
 
 
 
 
  <ELECTRONICALLY SIGNED>
                                           By: Reese Sales MD, [facility]     
  19     1416
D: 19 1204   _____________________________________
T: 19 1204   Reese Sales MD, FACC       /EPI

## 2020-01-12 ENCOUNTER — HOSPITAL ENCOUNTER (EMERGENCY)
Dept: HOSPITAL 96 - M.ERS | Age: 55
Discharge: HOME | End: 2020-01-12
Payer: MEDICARE

## 2020-01-12 VITALS — BODY MASS INDEX: 44.41 KG/M2 | HEIGHT: 68 IN | WEIGHT: 293 LBS

## 2020-01-12 VITALS — SYSTOLIC BLOOD PRESSURE: 127 MMHG | DIASTOLIC BLOOD PRESSURE: 88 MMHG

## 2020-01-12 DIAGNOSIS — M13.861: ICD-10-CM

## 2020-01-12 DIAGNOSIS — Z98.890: ICD-10-CM

## 2020-01-12 DIAGNOSIS — F17.210: ICD-10-CM

## 2020-01-12 DIAGNOSIS — Z88.6: ICD-10-CM

## 2020-01-12 DIAGNOSIS — E66.01: ICD-10-CM

## 2020-01-12 DIAGNOSIS — R10.11: Primary | ICD-10-CM

## 2020-01-12 DIAGNOSIS — M13.862: ICD-10-CM

## 2020-01-12 DIAGNOSIS — G89.29: ICD-10-CM

## 2020-01-12 DIAGNOSIS — Z88.8: ICD-10-CM

## 2020-01-12 LAB
ABSOLUTE BASOPHILS: 0.1 THOU/UL (ref 0–0.2)
ABSOLUTE EOSINOPHILS: 0.4 THOU/UL (ref 0–0.7)
ABSOLUTE MONOCYTES: 0.5 THOU/UL (ref 0–1.2)
ALBUMIN SERPL-MCNC: 2.7 G/DL (ref 3.4–5)
ALP SERPL-CCNC: 116 U/L (ref 46–116)
ALT SERPL-CCNC: 40 U/L (ref 30–65)
ANION GAP SERPL CALC-SCNC: 6 MMOL/L (ref 7–16)
APTT BLD: 25.5 SECONDS (ref 25–31.3)
AST SERPL-CCNC: 45 U/L (ref 15–37)
BASOPHILS NFR BLD AUTO: 1.6 %
BE: -1.5 MMOL/L
BILIRUB SERPL-MCNC: 0.2 MG/DL
BILIRUB UR-MCNC: NEGATIVE MG/DL
BUN SERPL-MCNC: 17 MG/DL (ref 7–18)
CALCIUM SERPL-MCNC: 8.5 MG/DL (ref 8.5–10.1)
CHLORIDE SERPL-SCNC: 105 MMOL/L (ref 98–107)
CO2 SERPL-SCNC: 31 MMOL/L (ref 21–32)
COLOR UR: YELLOW
CREAT SERPL-MCNC: 0.8 MG/DL (ref 0.6–1.3)
EOSINOPHIL NFR BLD: 4.9 %
GLUCOSE SERPL-MCNC: 90 MG/DL (ref 70–99)
GRANULOCYTES NFR BLD MANUAL: 64.4 %
HCT VFR BLD CALC: 38.1 % (ref 37–47)
HGB BLD-MCNC: 12.6 GM/DL (ref 12–15)
INR PPP: 1
KETONES UR STRIP-MCNC: NEGATIVE MG/DL
LYMPHOCYTES # BLD: 1.8 THOU/UL (ref 0.8–5.3)
LYMPHOCYTES NFR BLD AUTO: 23.3 %
MAGNESIUM SERPL-MCNC: 1.8 MG/DL (ref 1.8–2.4)
MCH RBC QN AUTO: 30.4 PG (ref 26–34)
MCHC RBC AUTO-ENTMCNC: 33.2 G/DL (ref 28–37)
MCV RBC: 91.8 FL (ref 80–100)
MONOCYTES NFR BLD: 5.8 %
MPV: 8 FL. (ref 7.2–11.1)
MUCUS: (no result) STRN/LPF
NEUTROPHILS # BLD: 5 THOU/UL (ref 1.6–8.1)
NUCLEATED RBCS: 0 /100WBC
PCO2 BLD: 35.4 MMHG (ref 35–45)
PLATELET COUNT*: 338 THOU/UL (ref 150–400)
PO2 BLD: 94.2 MMHG (ref 75–100)
POTASSIUM SERPL-SCNC: 4 MMOL/L (ref 3.5–5.1)
PROT SERPL-MCNC: 7.4 G/DL (ref 6.4–8.2)
PROT UR QL STRIP: NEGATIVE
PROTHROMBIN TIME: 10 SECONDS (ref 9.2–11.5)
RBC # BLD AUTO: 4.15 MIL/UL (ref 4.2–5)
RBC # UR STRIP: (no result) /UL
RBC #/AREA URNS HPF: (no result) /HPF (ref 0–2)
RDW-CV: 15.5 % (ref 10.5–14.5)
SODIUM SERPL-SCNC: 142 MMOL/L (ref 136–145)
SP GR UR STRIP: 1.02 (ref 1–1.03)
SQUAMOUS: (no result) /LPF (ref 0–3)
URINE CLARITY: CLEAR
URINE GLUCOSE-RANDOM: NEGATIVE
URINE LEUKOCYTES-REFLEX: (no result)
URINE NITRITE-REFLEX: NEGATIVE
URINE WBC-REFLEX: (no result) /HPF (ref 0–5)
UROBILINOGEN UR STRIP-ACNC: 0.2 E.U./DL (ref 0.2–1)
WBC # BLD AUTO: 7.9 THOU/UL (ref 4–11)

## 2020-01-13 NOTE — EKG
[place] [address]
Phone:  [phone]                     ELECTROCARDIOGRAM REPORT      
_______________________________________________________________________________
 
Name:       NARINDER REHMAN SUMAYA           Room:                      [facility]#:  [id number]      Account #:      [id number]  
Admission:  20     Attend Phys:                         
Discharge:  20     Date of Birth:  [birth year]  
         Report #: [id number]
    [id number]
_______________________________________________________________________________
THIS REPORT FOR:  //name//                      
 
                         [facility] ED
                                       
Test Date:    2020               Test Time:    10:18:59
Pat Name:     NARINDER REHMAN             Department:   
Patient ID:   [id number]            Room:          
Gender:       F                        Technician:   LEYDA
:          1965               Requested By: Lori Beckford
Order Number: [id number] MD:   Jordin Zhou
                                 Measurements
Intervals                              Axis          
Rate:         100                      P:            
MN:                                    QRS:          20
QRSD:         92                       T:            221
QT:           299                                    
QTc:          386                                    
                           Interpretive Statements
Atrial fibrillation
Low voltage, precordial leads
Nonspecific repol abnormality, diffuse leads
Compared to ECG 2019 12:04:38
Low QRS voltage now present
 
Electronically Signed On 2020 17:05:41 CST by Jordin Zhou
[email or web address]
 
 
 
 
 
 
 
 
 
 
 
 
 
 
 
 
 
  <ELECTRONICALLY SIGNED>
                                           By: Jordin Zhou MD, [facility]   
  20     1705
D: 20 1018   _____________________________________
T: 20 1018   Jordin Zhou MD, FACC     /EPI

## 2020-02-22 ENCOUNTER — HOSPITAL ENCOUNTER (EMERGENCY)
Dept: HOSPITAL 96 - M.ERS | Age: 55
Discharge: HOME | End: 2020-02-22
Payer: MEDICARE

## 2020-02-22 VITALS — BODY MASS INDEX: 44.41 KG/M2 | HEIGHT: 68 IN | WEIGHT: 293 LBS

## 2020-02-22 VITALS — DIASTOLIC BLOOD PRESSURE: 75 MMHG | SYSTOLIC BLOOD PRESSURE: 115 MMHG

## 2020-02-22 DIAGNOSIS — I48.91: ICD-10-CM

## 2020-02-22 DIAGNOSIS — G89.29: ICD-10-CM

## 2020-02-22 DIAGNOSIS — Z98.84: ICD-10-CM

## 2020-02-22 DIAGNOSIS — Z88.8: ICD-10-CM

## 2020-02-22 DIAGNOSIS — F17.210: ICD-10-CM

## 2020-02-22 DIAGNOSIS — Z88.6: ICD-10-CM

## 2020-02-22 DIAGNOSIS — Z98.890: ICD-10-CM

## 2020-02-22 DIAGNOSIS — M19.90: ICD-10-CM

## 2020-02-22 DIAGNOSIS — G89.18: ICD-10-CM

## 2020-02-22 DIAGNOSIS — K80.20: Primary | ICD-10-CM

## 2020-02-22 LAB
ABSOLUTE BASOPHILS: 0.1 THOU/UL (ref 0–0.2)
ABSOLUTE EOSINOPHILS: 0.2 THOU/UL (ref 0–0.7)
ABSOLUTE MONOCYTES: 0.8 THOU/UL (ref 0–1.2)
ALBUMIN SERPL-MCNC: 3.2 G/DL (ref 3.4–5)
ALP SERPL-CCNC: 92 U/L (ref 46–116)
ALT SERPL-CCNC: 34 U/L (ref 30–65)
ANION GAP SERPL CALC-SCNC: 11 MMOL/L (ref 7–16)
APTT BLD: 28.8 SECONDS (ref 25–31.3)
AST SERPL-CCNC: 32 U/L (ref 15–37)
BASOPHILS NFR BLD AUTO: 1 %
BILIRUB SERPL-MCNC: 0.5 MG/DL
BUN SERPL-MCNC: 11 MG/DL (ref 7–18)
CALCIUM SERPL-MCNC: 9.2 MG/DL (ref 8.5–10.1)
CHLORIDE SERPL-SCNC: 102 MMOL/L (ref 98–107)
CO2 SERPL-SCNC: 25 MMOL/L (ref 21–32)
CREAT SERPL-MCNC: 0.9 MG/DL (ref 0.6–1.3)
EOSINOPHIL NFR BLD: 2.3 %
GLUCOSE SERPL-MCNC: 109 MG/DL (ref 70–99)
GRANULOCYTES NFR BLD MANUAL: 76.7 %
HCT VFR BLD CALC: 42.7 % (ref 37–47)
HGB BLD-MCNC: 14.1 GM/DL (ref 12–15)
INR PPP: 1
LIPASE: 36 U/L (ref 73–393)
LYMPHOCYTES # BLD: 1.2 THOU/UL (ref 0.8–5.3)
LYMPHOCYTES NFR BLD AUTO: 12 %
MCH RBC QN AUTO: 29.8 PG (ref 26–34)
MCHC RBC AUTO-ENTMCNC: 33.1 G/DL (ref 28–37)
MCV RBC: 89.8 FL (ref 80–100)
MONOCYTES NFR BLD: 8 %
MPV: 8.5 FL. (ref 7.2–11.1)
NEUTROPHILS # BLD: 8 THOU/UL (ref 1.6–8.1)
NUCLEATED RBCS: 0 /100WBC
PLATELET COUNT*: 278 THOU/UL (ref 150–400)
POTASSIUM SERPL-SCNC: 3.9 MMOL/L (ref 3.5–5.1)
PROT SERPL-MCNC: 8.4 G/DL (ref 6.4–8.2)
PROTHROMBIN TIME: 10.7 SECONDS (ref 9.2–11.5)
RBC # BLD AUTO: 4.75 MIL/UL (ref 4.2–5)
RDW-CV: 14.6 % (ref 10.5–14.5)
SODIUM SERPL-SCNC: 138 MMOL/L (ref 136–145)
WBC # BLD AUTO: 10.4 THOU/UL (ref 4–11)

## 2020-02-23 NOTE — EKG
[place] [address]
Phone:  [phone]                     ELECTROCARDIOGRAM REPORT      
_______________________________________________________________________________
 
Name:         NARINDER REHMAN SUMAYA          Room:                     [facility]#:    [id number]     Account #:     [id number]  
Admission:    20    Attend Phys:                     
Discharge:    20    Date of Birth: [birth year]  
Date of Service: 20  Report #:      [id number]
        [id number]
_______________________________________________________________________________
THIS REPORT FOR:  //name//                      
 
                         [facility] ED
                                       
Test Date:    2020               Test Time:    19:15:13
Pat Name:     NARINDER REHMAN             Department:   
Patient ID:   [id number]            Room:          
Gender:                               Technician:   FL
:          1965               Requested By: Chele Santana
Order Number: [id number] MD:   Cb Kelly
                                 Measurements
Intervals                              Axis          
Rate:         147                      P:            
MO:                                    QRS:          20
QRSD:         84                       T:            205
QT:           241                                    
QTc:          377                                    
                           Interpretive Statements
Atrial fibrillation
Repol abnrm suggests ischemia, anterolateral
Compared to ECG 2020 10:18:59
Possible ischemia now present
 
Electronically Signed On 2020 12:20:07 CST by Cb Kelly
[email or web address]
 
 
 
 
 
 
 
 
 
 
 
 
 
 
 
 
 
 
 
  <ELECTRONICALLY SIGNED>
                                           By: Cb Kelly MD, [facility]      
  20     1220
D: 20   _____________________________________
T: 20   Cb Kelly MD, [facility]        /EPI

## 2020-03-02 ENCOUNTER — HOSPITAL ENCOUNTER (EMERGENCY)
Dept: HOSPITAL 96 - M.ERS | Age: 55
Discharge: HOME | End: 2020-03-02
Payer: MEDICARE

## 2020-03-02 VITALS — WEIGHT: 293 LBS | BODY MASS INDEX: 44.41 KG/M2 | HEIGHT: 68 IN

## 2020-03-02 VITALS — DIASTOLIC BLOOD PRESSURE: 84 MMHG | SYSTOLIC BLOOD PRESSURE: 116 MMHG

## 2020-03-02 DIAGNOSIS — Z88.6: ICD-10-CM

## 2020-03-02 DIAGNOSIS — I48.91: ICD-10-CM

## 2020-03-02 DIAGNOSIS — Z96.649: ICD-10-CM

## 2020-03-02 DIAGNOSIS — Z98.890: ICD-10-CM

## 2020-03-02 DIAGNOSIS — F17.210: ICD-10-CM

## 2020-03-02 DIAGNOSIS — R10.11: Primary | ICD-10-CM

## 2020-03-02 LAB
ABSOLUTE BASOPHILS: 0 THOU/UL (ref 0–0.2)
ABSOLUTE EOSINOPHILS: 0.4 THOU/UL (ref 0–0.7)
ABSOLUTE MONOCYTES: 0.6 THOU/UL (ref 0–1.2)
ALBUMIN SERPL-MCNC: 3 G/DL (ref 3.4–5)
ALP SERPL-CCNC: 108 U/L (ref 46–116)
ALT SERPL-CCNC: 31 U/L (ref 30–65)
ANION GAP SERPL CALC-SCNC: 12 MMOL/L (ref 7–16)
APTT BLD: 29.1 SECONDS (ref 25–31.3)
AST SERPL-CCNC: 27 U/L (ref 15–37)
BASOPHILS NFR BLD AUTO: 0.6 %
BILIRUB SERPL-MCNC: 0.2 MG/DL
BUN SERPL-MCNC: 16 MG/DL (ref 7–18)
CALCIUM SERPL-MCNC: 8.9 MG/DL (ref 8.5–10.1)
CHLORIDE SERPL-SCNC: 102 MMOL/L (ref 98–107)
CO2 SERPL-SCNC: 24 MMOL/L (ref 21–32)
CREAT SERPL-MCNC: 0.8 MG/DL (ref 0.6–1.3)
EOSINOPHIL NFR BLD: 4.7 %
GLUCOSE SERPL-MCNC: 104 MG/DL (ref 70–99)
GRANULOCYTES NFR BLD MANUAL: 66.3 %
HCT VFR BLD CALC: 41.9 % (ref 37–47)
HGB BLD-MCNC: 13.8 GM/DL (ref 12–15)
INR PPP: 1
LYMPHOCYTES # BLD: 1.8 THOU/UL (ref 0.8–5.3)
LYMPHOCYTES NFR BLD AUTO: 21.1 %
MCH RBC QN AUTO: 29.4 PG (ref 26–34)
MCHC RBC AUTO-ENTMCNC: 33 G/DL (ref 28–37)
MCV RBC: 89.1 FL (ref 80–100)
MONOCYTES NFR BLD: 7.3 %
MPV: 7.9 FL. (ref 7.2–11.1)
NEUTROPHILS # BLD: 5.6 THOU/UL (ref 1.6–8.1)
NT-PRO BRAIN NAT PEPTIDE: 374 PG/ML (ref ?–300)
NUCLEATED RBCS: 0 /100WBC
PLATELET COUNT*: 384 THOU/UL (ref 150–400)
POTASSIUM SERPL-SCNC: 4 MMOL/L (ref 3.5–5.1)
PROT SERPL-MCNC: 8.2 G/DL (ref 6.4–8.2)
PROTHROMBIN TIME: 10.2 SECONDS (ref 9.2–11.5)
RBC # BLD AUTO: 4.7 MIL/UL (ref 4.2–5)
RDW-CV: 14.7 % (ref 10.5–14.5)
SODIUM SERPL-SCNC: 138 MMOL/L (ref 136–145)
WBC # BLD AUTO: 8.4 THOU/UL (ref 4–11)

## 2020-03-02 NOTE — EKG
[place] [address]
Phone:  [phone]                     ELECTROCARDIOGRAM REPORT      
_______________________________________________________________________________
 
Name:         NARINDER REHMAN SUMAYA          Room:                     [facility]#:    [id number]     Account #:     [id number]  
Admission:    20    Attend Phys:                     
Discharge:    20    Date of Birth: [birth year]  
Date of Service: 20 1335  Report #:      [id number]
        [id number]
_______________________________________________________________________________
THIS REPORT FOR:  //name//                      
 
                         [facility] ED
                                       
Test Date:    2020               Test Time:    13:35:18
Pat Name:     NARINDER REHMAN             Department:   
Patient ID:   [id number]            Room:          
Gender:       F                        Technician:   
:          1965               Requested By: Stefan Mancini
Order Number: [id number] MD:   Cb Kelly
                                 Measurements
Intervals                              Axis          
Rate:         108                      P:            
MN:                                    QRS:          32
QRSD:         90                       T:            210
QT:           284                                    
QTc:          381                                    
                           Interpretive Statements
Atrial fibrillation
Low voltage, precordial leads
Borderline repolarization abnormality
Compared to ECG 2020 19:15:13
Low QRS voltage now present
Possible ischemia no longer present
rate has slowed
 
Electronically Signed On 3-2-2020 15:53:47 CST by Cb Kelly
[email or web address]
 
 
 
 
 
 
 
 
 
 
 
 
 
 
 
 
  <ELECTRONICALLY SIGNED>
                                           By: Cb Kelly MD, FAC      
  20     1553
D: 20 1335   _____________________________________
T: 20 1335   Cb Kelly MD, [facility]        /EPI

## 2020-03-24 ENCOUNTER — HOSPITAL ENCOUNTER (EMERGENCY)
Dept: HOSPITAL 96 - M.ERS | Age: 55
Discharge: HOME | End: 2020-03-24
Payer: MEDICARE

## 2020-03-24 VITALS — DIASTOLIC BLOOD PRESSURE: 72 MMHG | SYSTOLIC BLOOD PRESSURE: 111 MMHG

## 2020-03-24 VITALS — WEIGHT: 293 LBS | HEIGHT: 68 IN | BODY MASS INDEX: 44.41 KG/M2

## 2020-03-24 DIAGNOSIS — Z88.8: ICD-10-CM

## 2020-03-24 DIAGNOSIS — I48.91: ICD-10-CM

## 2020-03-24 DIAGNOSIS — Z88.6: ICD-10-CM

## 2020-03-24 DIAGNOSIS — Z98.890: ICD-10-CM

## 2020-03-24 DIAGNOSIS — F17.210: ICD-10-CM

## 2020-03-24 DIAGNOSIS — F41.0: Primary | ICD-10-CM

## 2020-03-24 DIAGNOSIS — Z96.649: ICD-10-CM

## 2020-03-24 LAB
ABSOLUTE BASOPHILS: 0.1 THOU/UL (ref 0–0.2)
ABSOLUTE EOSINOPHILS: 0.3 THOU/UL (ref 0–0.7)
ABSOLUTE MONOCYTES: 0.5 THOU/UL (ref 0–1.2)
ALBUMIN SERPL-MCNC: 3.2 G/DL (ref 3.4–5)
ALP SERPL-CCNC: 79 U/L (ref 46–116)
ALT SERPL-CCNC: 38 U/L (ref 30–65)
ANION GAP SERPL CALC-SCNC: 11 MMOL/L (ref 7–16)
APTT BLD: 26 SECONDS (ref 25–31.3)
AST SERPL-CCNC: 39 U/L (ref 15–37)
BASOPHILS NFR BLD AUTO: 1.2 %
BILIRUB SERPL-MCNC: 0.2 MG/DL
BUN SERPL-MCNC: 21 MG/DL (ref 7–18)
CALCIUM SERPL-MCNC: 8.9 MG/DL (ref 8.5–10.1)
CHLORIDE SERPL-SCNC: 106 MMOL/L (ref 98–107)
CK-MB MASS: 0.9 NG/ML
CO2 SERPL-SCNC: 24 MMOL/L (ref 21–32)
CREAT SERPL-MCNC: 0.8 MG/DL (ref 0.6–1.3)
EOSINOPHIL NFR BLD: 3.7 %
GLUCOSE SERPL-MCNC: 111 MG/DL (ref 70–99)
GRANULOCYTES NFR BLD MANUAL: 64.6 %
HCT VFR BLD CALC: 38.9 % (ref 37–47)
HGB BLD-MCNC: 13.1 GM/DL (ref 12–15)
INR PPP: 1
LIPASE: 77 U/L (ref 73–393)
LYMPHOCYTES # BLD: 1.8 THOU/UL (ref 0.8–5.3)
LYMPHOCYTES NFR BLD AUTO: 23.9 %
MAGNESIUM SERPL-MCNC: 2 MG/DL (ref 1.8–2.4)
MCH RBC QN AUTO: 29.9 PG (ref 26–34)
MCHC RBC AUTO-ENTMCNC: 33.7 G/DL (ref 28–37)
MCV RBC: 88.8 FL (ref 80–100)
MONOCYTES NFR BLD: 6.6 %
MPV: 8.7 FL. (ref 7.2–11.1)
NEUTROPHILS # BLD: 4.9 THOU/UL (ref 1.6–8.1)
NT-PRO BRAIN NAT PEPTIDE: 638 PG/ML (ref ?–300)
NUCLEATED RBCS: 0 /100WBC
PLATELET COUNT*: 233 THOU/UL (ref 150–400)
POTASSIUM SERPL-SCNC: 3.9 MMOL/L (ref 3.5–5.1)
PROT SERPL-MCNC: 7.6 G/DL (ref 6.4–8.2)
PROTHROMBIN TIME: 10 SECONDS (ref 9.2–11.5)
RBC # BLD AUTO: 4.38 MIL/UL (ref 4.2–5)
RDW-CV: 16.2 % (ref 10.5–14.5)
SODIUM SERPL-SCNC: 141 MMOL/L (ref 136–145)
WBC # BLD AUTO: 7.5 THOU/UL (ref 4–11)

## 2020-03-25 NOTE — EKG
[place] [address]
Phone:  [phone]                     ELECTROCARDIOGRAM REPORT      
_______________________________________________________________________________
 
Name:         NARINDER REHMAN SUMAYA          Room:                     [facility]#:    [id number]     Account #:     [id number]  
Admission:    20    Attend Phys:                     
Discharge:    20    Date of Birth: [birth year]  
Date of Service: 20 1611  Report #:      [id number]
        [id number]
_______________________________________________________________________________
THIS REPORT FOR:  //name//                      
 
                         [facility] ED
                                       
Test Date:    2020               Test Time:    16:11:47
Pat Name:     NARINDER REHMAN             Department:   
Patient ID:   [id number]            Room:          
Gender:       F                        Technician:   
:          1965               Requested By: Stefan Mancini
Order Number: [id number] MD:   Cb Kelly
                                 Measurements
Intervals                              Axis          
Rate:         126                      P:            
ND:                                    QRS:          18
QRSD:         91                       T:            220
QT:           241                                    
QTc:          349                                    
                           Interpretive Statements
Atrial fibrillation
poor r wave progression
Low voltage, precordial leads
Nonspecific repol abnormality, diffuse leads
Compared to ECG 2020 13:35:18
No significant changes
 
Electronically Signed On 3- 9:24:54 CDT by Cb Kelly
[email or web address]
 
 
 
 
 
 
 
 
 
 
 
 
 
 
 
 
 
  <ELECTRONICALLY SIGNED>
                                           By: Cb Kelly MD, FACC      
  20     0924
D: 20 1611   _____________________________________
T: 20 1611   Cb Kelly MD, FAC        /EPI

## 2020-09-29 NOTE — NUR
ASSUMED PT CARE REPORT RECEIVED FROM NURSE. PT IS AOX4 COMPLAINS OF PAIN IN
ABDOMEN. IV PAIN MEDICINE GIVEN AS ORDERED. PARK AND BILIDRAIN IN PLACE. IV
FLUID INFUSING  PER HOUR AS ORDERED. PT IS UP TO CHAIR. STOOL SOFTENER
AND MIRALAX GIVEN TO RELIEVE CONSTIPATION. IV ABX GIVEN. CALL LIGHT AT REACH [Dear  ___] : Dear  [unfilled], [Consult Letter:] : I had the pleasure of evaluating your patient, [unfilled]. [Please see my note below.] : Please see my note below. [Consult Closing:] : Thank you very much for allowing me to participate in the care of this patient.  If you have any questions, please do not hesitate to contact me. [Sincerely,] : Sincerely, [FreeTextEntry2] : Dr. Michael Mendelsohn\[address], [address]\[place] [id number] [FreeTextEntry3] : Dev

## 2020-11-13 ENCOUNTER — HOSPITAL ENCOUNTER (EMERGENCY)
Dept: HOSPITAL 96 - M.ERS | Age: 55
Discharge: HOME | End: 2020-11-13
Payer: MEDICARE

## 2020-11-13 VITALS — BODY MASS INDEX: 43.35 KG/M2 | WEIGHT: 286.01 LBS | HEIGHT: 68 IN

## 2020-11-13 VITALS — SYSTOLIC BLOOD PRESSURE: 141 MMHG | DIASTOLIC BLOOD PRESSURE: 70 MMHG

## 2020-11-13 DIAGNOSIS — R11.2: ICD-10-CM

## 2020-11-13 DIAGNOSIS — F17.210: ICD-10-CM

## 2020-11-13 DIAGNOSIS — I48.91: ICD-10-CM

## 2020-11-13 DIAGNOSIS — Z88.6: ICD-10-CM

## 2020-11-13 DIAGNOSIS — Z88.8: ICD-10-CM

## 2020-11-13 DIAGNOSIS — Z90.49: ICD-10-CM

## 2020-11-13 DIAGNOSIS — Z20.828: ICD-10-CM

## 2020-11-13 DIAGNOSIS — Z79.82: ICD-10-CM

## 2020-11-13 DIAGNOSIS — Z98.84: ICD-10-CM

## 2020-11-13 DIAGNOSIS — Z98.890: ICD-10-CM

## 2020-11-13 DIAGNOSIS — R10.12: Primary | ICD-10-CM

## 2020-11-13 DIAGNOSIS — Z79.899: ICD-10-CM

## 2020-11-13 LAB
ABSOLUTE BASOPHILS: 0.1 THOU/UL (ref 0–0.2)
ABSOLUTE EOSINOPHILS: 0.1 THOU/UL (ref 0–0.7)
ABSOLUTE MONOCYTES: 0.6 THOU/UL (ref 0–1.2)
ALBUMIN SERPL-MCNC: 3.4 G/DL (ref 3.4–5)
ALP SERPL-CCNC: 89 U/L (ref 46–116)
ALT SERPL-CCNC: 41 U/L (ref 30–65)
ANION GAP SERPL CALC-SCNC: 8 MMOL/L (ref 7–16)
AST SERPL-CCNC: 41 U/L (ref 15–37)
BASOPHILS NFR BLD AUTO: 0.7 %
BILIRUB SERPL-MCNC: 0.6 MG/DL
BUN SERPL-MCNC: 20 MG/DL (ref 7–18)
CALCIUM SERPL-MCNC: 9.3 MG/DL (ref 8.5–10.1)
CHLORIDE SERPL-SCNC: 103 MMOL/L (ref 98–107)
CO2 SERPL-SCNC: 29 MMOL/L (ref 21–32)
CREAT SERPL-MCNC: 0.9 MG/DL (ref 0.6–1.3)
EOSINOPHIL NFR BLD: 0.8 %
GLUCOSE SERPL-MCNC: 106 MG/DL (ref 70–99)
GRANULOCYTES NFR BLD MANUAL: 66.9 %
HCT VFR BLD CALC: 47.3 % (ref 37–47)
HGB BLD-MCNC: 16.1 GM/DL (ref 12–15)
LIPASE: 72 U/L (ref 73–393)
LYMPHOCYTES # BLD: 2.2 THOU/UL (ref 0.8–5.3)
LYMPHOCYTES NFR BLD AUTO: 24.6 %
MCH RBC QN AUTO: 32 PG (ref 26–34)
MCHC RBC AUTO-ENTMCNC: 34.1 G/DL (ref 28–37)
MCV RBC: 93.9 FL (ref 80–100)
MONOCYTES NFR BLD: 7 %
MPV: 8.3 FL. (ref 7.2–11.1)
NEUTROPHILS # BLD: 5.9 THOU/UL (ref 1.6–8.1)
NUCLEATED RBCS: 0 /100WBC
PLATELET COUNT*: 245 THOU/UL (ref 150–400)
POTASSIUM SERPL-SCNC: 3.6 MMOL/L (ref 3.5–5.1)
PROT SERPL-MCNC: 8.2 G/DL (ref 6.4–8.2)
RBC # BLD AUTO: 5.04 MIL/UL (ref 4.2–5)
RDW-CV: 13.7 % (ref 10.5–14.5)
SODIUM SERPL-SCNC: 140 MMOL/L (ref 136–145)
WBC # BLD AUTO: 8.8 THOU/UL (ref 4–11)

## 2020-11-16 NOTE — EKG
[place] [address]
Phone:  [phone]                     ELECTROCARDIOGRAM REPORT      
_______________________________________________________________________________
 
Name:         NARINDER REHMAN SUMAYA          Room:                     [facility]#:    [id number]     Account #:     [id number]  
Admission:    20    Attend Phys:                     
Discharge:    20    Date of Birth: [birth year]  
Date of Service: 20  Report #:      [id number]
        [id number]
_______________________________________________________________________________
THIS REPORT FOR:  //name//                      
 
                         [facility] ED
                                       
Test Date:    2020               Test Time:    18:06:23
Pat Name:     NARINDER REHMAN             Department:   
Patient ID:   [id number]            Room:          
Gender:                               Technician:   NEVIN
:          1965               Requested By: Ciaran De La Torre
Order Number: [id number] MD:   Cb Kelly
                                 Measurements
Intervals                              Axis          
Rate:         108                      P:            
LA:                                    QRS:          31
QRSD:         88                       T:            232
QT:           295                                    
QTc:          396                                    
                           Interpretive Statements
atrial fibrillation
Repol abnrm suggests ischemia, anterolateral
Compared to ECG 2020 16:11:47
 
Poor R-wave progression no longer present
Electronically Signed On 2020 12:13:21 CST by Cb Kelly
[email or web address]
 
 
 
 
 
 
 
 
 
 
 
 
 
 
 
 
 
 
 
  <ELECTRONICALLY SIGNED>
                                           By: Cb Kelly MD, FAC      
  20     1213
D: 20 180   _____________________________________
T: 20 180   Cb Kelly MD, [facility]        /EPI

## 2021-06-04 ENCOUNTER — HOSPITAL ENCOUNTER (INPATIENT)
Dept: HOSPITAL 96 - M.ERS | Age: 56
LOS: 3 days | Discharge: HOME | DRG: 372 | End: 2021-06-07
Attending: INTERNAL MEDICINE | Admitting: INTERNAL MEDICINE
Payer: MEDICARE

## 2021-06-04 VITALS — WEIGHT: 293 LBS | HEIGHT: 67.99 IN | BODY MASS INDEX: 44.41 KG/M2

## 2021-06-04 VITALS — DIASTOLIC BLOOD PRESSURE: 95 MMHG | SYSTOLIC BLOOD PRESSURE: 124 MMHG

## 2021-06-04 VITALS — SYSTOLIC BLOOD PRESSURE: 113 MMHG | DIASTOLIC BLOOD PRESSURE: 87 MMHG

## 2021-06-04 VITALS — SYSTOLIC BLOOD PRESSURE: 122 MMHG | DIASTOLIC BLOOD PRESSURE: 50 MMHG

## 2021-06-04 DIAGNOSIS — Z20.822: ICD-10-CM

## 2021-06-04 DIAGNOSIS — Z79.82: ICD-10-CM

## 2021-06-04 DIAGNOSIS — F41.9: ICD-10-CM

## 2021-06-04 DIAGNOSIS — Z98.84: ICD-10-CM

## 2021-06-04 DIAGNOSIS — E87.6: ICD-10-CM

## 2021-06-04 DIAGNOSIS — A04.9: Primary | ICD-10-CM

## 2021-06-04 DIAGNOSIS — Z91.14: ICD-10-CM

## 2021-06-04 DIAGNOSIS — K76.0: ICD-10-CM

## 2021-06-04 DIAGNOSIS — Z98.891: ICD-10-CM

## 2021-06-04 DIAGNOSIS — F32.9: ICD-10-CM

## 2021-06-04 DIAGNOSIS — Z90.49: ICD-10-CM

## 2021-06-04 DIAGNOSIS — Z79.899: ICD-10-CM

## 2021-06-04 DIAGNOSIS — I48.20: ICD-10-CM

## 2021-06-04 DIAGNOSIS — Z88.8: ICD-10-CM

## 2021-06-04 DIAGNOSIS — E66.01: ICD-10-CM

## 2021-06-04 DIAGNOSIS — F17.210: ICD-10-CM

## 2021-06-04 LAB
ABSOLUTE BASOPHILS: 0.1 THOU/UL (ref 0–0.2)
ABSOLUTE EOSINOPHILS: 0.1 THOU/UL (ref 0–0.7)
ABSOLUTE MONOCYTES: 0.5 THOU/UL (ref 0–1.2)
ALBUMIN SERPL-MCNC: 3.3 G/DL (ref 3.4–5)
ALP SERPL-CCNC: 97 U/L (ref 46–116)
ALT SERPL-CCNC: 66 U/L (ref 30–65)
ANION GAP SERPL CALC-SCNC: 9 MMOL/L (ref 7–16)
AST SERPL-CCNC: 50 U/L (ref 15–37)
BASOPHILS NFR BLD AUTO: 1.2 %
BENZODIAZ UR-MCNC: POSITIVE UG/L
BILIRUB SERPL-MCNC: 0.4 MG/DL
BILIRUB UR-MCNC: NEGATIVE MG/DL
BUN SERPL-MCNC: 15 MG/DL (ref 7–18)
CALCIUM SERPL-MCNC: 9.2 MG/DL (ref 8.5–10.1)
CHLORIDE SERPL-SCNC: 103 MMOL/L (ref 98–107)
CO2 SERPL-SCNC: 25 MMOL/L (ref 21–32)
COLOR UR: YELLOW
CREAT SERPL-MCNC: 0.7 MG/DL (ref 0.6–1.3)
EOSINOPHIL NFR BLD: 1.6 %
GLUCOSE SERPL-MCNC: 99 MG/DL (ref 70–99)
GRANULOCYTES NFR BLD MANUAL: 65.2 %
HCT VFR BLD CALC: 42.9 % (ref 37–47)
HGB BLD-MCNC: 14.8 GM/DL (ref 12–15)
KETONES UR STRIP-MCNC: NEGATIVE MG/DL
LIPASE: 78 U/L (ref 73–393)
LYMPHOCYTES # BLD: 2.1 THOU/UL (ref 0.8–5.3)
LYMPHOCYTES NFR BLD AUTO: 25.8 %
MCH RBC QN AUTO: 32.3 PG (ref 26–34)
MCHC RBC AUTO-ENTMCNC: 34.6 G/DL (ref 28–37)
MCV RBC: 93.4 FL (ref 80–100)
MONOCYTES NFR BLD: 6.2 %
MPV: 7.8 FL. (ref 7.2–11.1)
NEUTROPHILS # BLD: 5.4 THOU/UL (ref 1.6–8.1)
NUCLEATED RBCS: 0 /100WBC
PLATELET COUNT*: 249 THOU/UL (ref 150–400)
POTASSIUM SERPL-SCNC: 3.6 MMOL/L (ref 3.5–5.1)
PROT SERPL-MCNC: 7.8 G/DL (ref 6.4–8.2)
PROT UR QL STRIP: NEGATIVE
RBC # BLD AUTO: 4.6 MIL/UL (ref 4.2–5)
RBC # UR STRIP: NEGATIVE /UL
RDW-CV: 13.6 % (ref 10.5–14.5)
SODIUM SERPL-SCNC: 137 MMOL/L (ref 136–145)
SP GR UR STRIP: <= 1.005 (ref 1–1.03)
URINE CLARITY: CLEAR
URINE GLUCOSE-RANDOM: NEGATIVE
URINE LEUKOCYTES-REFLEX: NEGATIVE
URINE NITRITE-REFLEX: NEGATIVE
UROBILINOGEN UR STRIP-ACNC: 0.2 E.U./DL (ref 0.2–1)
WBC # BLD AUTO: 8.3 THOU/UL (ref 4–11)

## 2021-06-04 NOTE — EKG
[place] [address]
Phone:  [phone]                     ELECTROCARDIOGRAM REPORT      
_______________________________________________________________________________
 
Name:         NARINDER REHMAN SUMAYA          Room:                     [facility] ER 
M.R.#:    [id number]     Account #:     [id number]  
Admission:    21    Attend Phys:                     
Discharge:                Date of Birth: [birth year]  
Date of Service: 21 1241  Report #:      [id number]
        [id number]
_______________________________________________________________________________
THIS REPORT FOR:  //name//                      
 
                         [facility] ED
                                       
Test Date:    2021               Test Time:    12:41:14
Pat Name:     NARINDER REHMAN             Department:   
Patient ID:   [id number]            Room:          
Gender:       F                        Technician:   
:          1965               Requested By: Real Carrasco
Order Number: [id number] MD:   Cb Kelly
                                 Measurements
Intervals                              Axis          
Rate:         140                      P:            
IN:                                    QRS:          21
QRSD:         70                       T:            -76
QT:           324                                    
QTc:          495                                    
                           Interpretive Statements
Atrial fibrillation
Low voltage, precordial leads
Borderline repolarization abnormality
Borderline prolonged QT interval
Baseline wander in lead(s) II,III,aVL,aVF
Compared to ECG 2020 18:06:23
Low QRS voltage now present
Possible ischemia no longer present
rate has increased
Electronically Signed On 2021 15:55:00 CDT by Cb Kelly
[email or web address]
 
 
 
 
 
 
 
 
 
 
 
 
 
 
 
  <ELECTRONICALLY SIGNED>
                                           By: Cb Kelly MD, [facility]      
  21     1555
D: 21 1241   _____________________________________
T: 21 1241   Cb Kelly MD, [facility]        /EPI

## 2021-06-05 VITALS — DIASTOLIC BLOOD PRESSURE: 76 MMHG | SYSTOLIC BLOOD PRESSURE: 113 MMHG

## 2021-06-05 VITALS — SYSTOLIC BLOOD PRESSURE: 91 MMHG | DIASTOLIC BLOOD PRESSURE: 45 MMHG

## 2021-06-05 VITALS — SYSTOLIC BLOOD PRESSURE: 120 MMHG | DIASTOLIC BLOOD PRESSURE: 65 MMHG

## 2021-06-05 VITALS — DIASTOLIC BLOOD PRESSURE: 67 MMHG | SYSTOLIC BLOOD PRESSURE: 110 MMHG

## 2021-06-05 VITALS — SYSTOLIC BLOOD PRESSURE: 101 MMHG | DIASTOLIC BLOOD PRESSURE: 66 MMHG

## 2021-06-05 VITALS — SYSTOLIC BLOOD PRESSURE: 113 MMHG | DIASTOLIC BLOOD PRESSURE: 76 MMHG

## 2021-06-05 LAB
ABSOLUTE BASOPHILS: 0 THOU/UL (ref 0–0.2)
ABSOLUTE EOSINOPHILS: 0.1 THOU/UL (ref 0–0.7)
ABSOLUTE MONOCYTES: 0.3 THOU/UL (ref 0–1.2)
ANION GAP SERPL CALC-SCNC: 7 MMOL/L (ref 7–16)
BASOPHILS NFR BLD AUTO: 0.6 %
BUN SERPL-MCNC: 13 MG/DL (ref 7–18)
CALCIUM SERPL-MCNC: 9.3 MG/DL (ref 8.5–10.1)
CHLORIDE SERPL-SCNC: 104 MMOL/L (ref 98–107)
CO2 SERPL-SCNC: 28 MMOL/L (ref 21–32)
CREAT SERPL-MCNC: 0.7 MG/DL (ref 0.6–1.3)
EOSINOPHIL NFR BLD: 1.8 %
GLUCOSE SERPL-MCNC: 134 MG/DL (ref 70–99)
GRANULOCYTES NFR BLD MANUAL: 53.7 %
HCT VFR BLD CALC: 39.1 % (ref 37–47)
HGB BLD-MCNC: 13.2 GM/DL (ref 12–15)
LYMPHOCYTES # BLD: 2.5 THOU/UL (ref 0.8–5.3)
LYMPHOCYTES NFR BLD AUTO: 38.6 %
MCH RBC QN AUTO: 31.8 PG (ref 26–34)
MCHC RBC AUTO-ENTMCNC: 33.8 G/DL (ref 28–37)
MCV RBC: 94.1 FL (ref 80–100)
MONOCYTES NFR BLD: 5.3 %
MPV: 8.2 FL. (ref 7.2–11.1)
NEUTROPHILS # BLD: 3.5 THOU/UL (ref 1.6–8.1)
NUCLEATED RBCS: 0 /100WBC
PLATELET COUNT*: 225 THOU/UL (ref 150–400)
POTASSIUM SERPL-SCNC: 3.4 MMOL/L (ref 3.5–5.1)
RBC # BLD AUTO: 4.15 MIL/UL (ref 4.2–5)
RDW-CV: 13.6 % (ref 10.5–14.5)
SODIUM SERPL-SCNC: 139 MMOL/L (ref 136–145)
WBC # BLD AUTO: 6.5 THOU/UL (ref 4–11)

## 2021-06-06 VITALS — DIASTOLIC BLOOD PRESSURE: 68 MMHG | SYSTOLIC BLOOD PRESSURE: 113 MMHG

## 2021-06-06 VITALS — DIASTOLIC BLOOD PRESSURE: 60 MMHG | SYSTOLIC BLOOD PRESSURE: 107 MMHG

## 2021-06-06 VITALS — DIASTOLIC BLOOD PRESSURE: 64 MMHG | SYSTOLIC BLOOD PRESSURE: 111 MMHG

## 2021-06-06 VITALS — DIASTOLIC BLOOD PRESSURE: 59 MMHG | SYSTOLIC BLOOD PRESSURE: 97 MMHG

## 2021-06-06 VITALS — SYSTOLIC BLOOD PRESSURE: 112 MMHG | DIASTOLIC BLOOD PRESSURE: 51 MMHG

## 2021-06-06 VITALS — DIASTOLIC BLOOD PRESSURE: 69 MMHG | SYSTOLIC BLOOD PRESSURE: 112 MMHG

## 2021-06-06 LAB
ANION GAP SERPL CALC-SCNC: 7 MMOL/L (ref 7–16)
BUN SERPL-MCNC: 17 MG/DL (ref 7–18)
CALCIUM SERPL-MCNC: 9 MG/DL (ref 8.5–10.1)
CHLORIDE SERPL-SCNC: 106 MMOL/L (ref 98–107)
CO2 SERPL-SCNC: 28 MMOL/L (ref 21–32)
CREAT SERPL-MCNC: 0.7 MG/DL (ref 0.6–1.3)
GLUCOSE SERPL-MCNC: 87 MG/DL (ref 70–99)
HCT VFR BLD CALC: 37.3 % (ref 37–47)
HGB BLD-MCNC: 12.5 GM/DL (ref 12–15)
MCH RBC QN AUTO: 31.6 PG (ref 26–34)
MCHC RBC AUTO-ENTMCNC: 33.4 G/DL (ref 28–37)
MCV RBC: 94.4 FL (ref 80–100)
MPV: 7.8 FL. (ref 7.2–11.1)
PLATELET COUNT*: 202 THOU/UL (ref 150–400)
POTASSIUM SERPL-SCNC: 3.9 MMOL/L (ref 3.5–5.1)
RBC # BLD AUTO: 3.95 MIL/UL (ref 4.2–5)
RDW-CV: 13.8 % (ref 10.5–14.5)
SODIUM SERPL-SCNC: 141 MMOL/L (ref 136–145)
WBC # BLD AUTO: 5.9 THOU/UL (ref 4–11)

## 2021-06-06 NOTE — NUR
MRI ORDERED AND ATIVAN IVP GIVEN FOR ANXIETY PER PATIENT REQUEST
PATIENT WITH INCREASED ANXIETY AND UNABLE TO DO MRI WHEN TAKEN DOWN TO MRI
DEPT
WILL ATTEMPT TO DO MRI TOMORROW

## 2021-06-07 VITALS — SYSTOLIC BLOOD PRESSURE: 99 MMHG | DIASTOLIC BLOOD PRESSURE: 58 MMHG

## 2021-06-07 VITALS — DIASTOLIC BLOOD PRESSURE: 59 MMHG | SYSTOLIC BLOOD PRESSURE: 100 MMHG

## 2021-06-07 VITALS — DIASTOLIC BLOOD PRESSURE: 58 MMHG | SYSTOLIC BLOOD PRESSURE: 99 MMHG

## 2021-06-07 VITALS — SYSTOLIC BLOOD PRESSURE: 110 MMHG | DIASTOLIC BLOOD PRESSURE: 60 MMHG

## 2021-06-07 VITALS — DIASTOLIC BLOOD PRESSURE: 74 MMHG | SYSTOLIC BLOOD PRESSURE: 133 MMHG

## 2021-06-07 LAB
ANION GAP SERPL CALC-SCNC: 7 MMOL/L (ref 7–16)
BUN SERPL-MCNC: 22 MG/DL (ref 7–18)
CALCIUM SERPL-MCNC: 9.2 MG/DL (ref 8.5–10.1)
CHLORIDE SERPL-SCNC: 103 MMOL/L (ref 98–107)
CO2 SERPL-SCNC: 29 MMOL/L (ref 21–32)
CREAT SERPL-MCNC: 0.7 MG/DL (ref 0.6–1.3)
GLUCOSE SERPL-MCNC: 83 MG/DL (ref 70–99)
HCT VFR BLD CALC: 39.2 % (ref 37–47)
HGB BLD-MCNC: 13.2 GM/DL (ref 12–15)
MCH RBC QN AUTO: 31.7 PG (ref 26–34)
MCHC RBC AUTO-ENTMCNC: 33.6 G/DL (ref 28–37)
MCV RBC: 94.3 FL (ref 80–100)
MPV: 8.1 FL. (ref 7.2–11.1)
PLATELET COUNT*: 218 THOU/UL (ref 150–400)
POTASSIUM SERPL-SCNC: 4 MMOL/L (ref 3.5–5.1)
RBC # BLD AUTO: 4.16 MIL/UL (ref 4.2–5)
RDW-CV: 13.6 % (ref 10.5–14.5)
SODIUM SERPL-SCNC: 139 MMOL/L (ref 136–145)
WBC # BLD AUTO: 6.9 THOU/UL (ref 4–11)

## 2021-06-07 NOTE — NUR
I ASSUMED CARE OF THE PATIENT AT 0700.  SHE IS ALERT AND ORIENTED X4 AND IS UP
WITH ASSIST OF 2.  BED IS IN THE LOW LOCKED POSITION AND CALL LIGHT IS IN
REACH.  HOURLY ROUNDING IS COMPLETED AND PATIENT NEEDS ARE MET.  PAIN IS
MANAGED WITH PRN MEDS.  SHE HAD AN MRI AND NEW ORDERS WERE OBTAINED.  ATIVAN
WAS USED TO GET PATIENT INTO MRI MACHINE.  WILL CONTINUE TO MONITOR.
DISCHARGED TO HOME WITH FAMILY.

## 2021-06-07 NOTE — NUR
NO ACUTE CHANGES THROUGHOUT SHIFT. FULL ASSESSMENT COMPLETED AS CHARTED. ALL
ROUNDINGS COMPLETED, ALL NEEDS MET. PT CONTINUES TO REPORT R LEG PAIN.